# Patient Record
Sex: FEMALE | Race: BLACK OR AFRICAN AMERICAN
[De-identification: names, ages, dates, MRNs, and addresses within clinical notes are randomized per-mention and may not be internally consistent; named-entity substitution may affect disease eponyms.]

---

## 2019-04-02 ENCOUNTER — HOSPITAL ENCOUNTER (INPATIENT)
Dept: HOSPITAL 72 - EMR | Age: 59
LOS: 3 days | Discharge: ANOTHER HOSPITAL | DRG: 863 | End: 2019-04-05
Payer: COMMERCIAL

## 2019-04-02 VITALS — SYSTOLIC BLOOD PRESSURE: 91 MMHG | DIASTOLIC BLOOD PRESSURE: 68 MMHG

## 2019-04-02 VITALS — DIASTOLIC BLOOD PRESSURE: 83 MMHG | SYSTOLIC BLOOD PRESSURE: 127 MMHG

## 2019-04-02 VITALS — HEIGHT: 65 IN | WEIGHT: 230 LBS | BODY MASS INDEX: 38.32 KG/M2

## 2019-04-02 VITALS — SYSTOLIC BLOOD PRESSURE: 125 MMHG | DIASTOLIC BLOOD PRESSURE: 86 MMHG

## 2019-04-02 DIAGNOSIS — T81.41XA: Primary | ICD-10-CM

## 2019-04-02 DIAGNOSIS — I10: ICD-10-CM

## 2019-04-02 DIAGNOSIS — G89.4: ICD-10-CM

## 2019-04-02 DIAGNOSIS — Z98.84: ICD-10-CM

## 2019-04-02 DIAGNOSIS — D63.8: ICD-10-CM

## 2019-04-02 DIAGNOSIS — J45.909: ICD-10-CM
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
## 2019-04-02 DIAGNOSIS — E87.6: ICD-10-CM

## 2019-04-02 DIAGNOSIS — K80.20: ICD-10-CM

## 2019-04-02 DIAGNOSIS — M54.9: ICD-10-CM

## 2019-04-02 DIAGNOSIS — L02.91: ICD-10-CM

## 2019-04-02 DIAGNOSIS — E78.5: ICD-10-CM

## 2019-04-02 DIAGNOSIS — E66.01: ICD-10-CM

## 2019-04-02 LAB
ADD MANUAL DIFF: YES
ALBUMIN SERPL-MCNC: 2.5 G/DL (ref 3.4–5)
ALBUMIN/GLOB SERPL: 0.6 {RATIO} (ref 1–2.7)
ALP SERPL-CCNC: 58 U/L (ref 46–116)
ALT SERPL-CCNC: 21 U/L (ref 12–78)
ANION GAP SERPL CALC-SCNC: 18 MMOL/L (ref 5–15)
AST SERPL-CCNC: 16 U/L (ref 15–37)
BILIRUB SERPL-MCNC: 0.8 MG/DL (ref 0.2–1)
BUN SERPL-MCNC: 24 MG/DL (ref 7–18)
CALCIUM SERPL-MCNC: 8.5 MG/DL (ref 8.5–10.1)
CHLORIDE SERPL-SCNC: 104 MMOL/L (ref 98–107)
CO2 SERPL-SCNC: 22 MMOL/L (ref 21–32)
CREAT SERPL-MCNC: 1.3 MG/DL (ref 0.55–1.3)
ERYTHROCYTE [DISTWIDTH] IN BLOOD BY AUTOMATED COUNT: 11.7 % (ref 11.6–14.8)
GLOBULIN SER-MCNC: 4.4 G/DL
HCT VFR BLD CALC: 39.5 % (ref 37–47)
HGB BLD-MCNC: 13 G/DL (ref 12–16)
MCV RBC AUTO: 97 FL (ref 80–99)
PLATELET # BLD: 264 K/UL (ref 150–450)
POTASSIUM SERPL-SCNC: 3.1 MMOL/L (ref 3.5–5.1)
RBC # BLD AUTO: 4.05 M/UL (ref 4.2–5.4)
SODIUM SERPL-SCNC: 144 MMOL/L (ref 136–145)
WBC # BLD AUTO: 19.1 K/UL (ref 4.8–10.8)

## 2019-04-02 PROCEDURE — 84550 ASSAY OF BLOOD/URIC ACID: CPT

## 2019-04-02 PROCEDURE — 83690 ASSAY OF LIPASE: CPT

## 2019-04-02 PROCEDURE — 83735 ASSAY OF MAGNESIUM: CPT

## 2019-04-02 PROCEDURE — 87181 SC STD AGAR DILUTION PER AGT: CPT

## 2019-04-02 PROCEDURE — 96368 THER/DIAG CONCURRENT INF: CPT

## 2019-04-02 PROCEDURE — 80053 COMPREHEN METABOLIC PANEL: CPT

## 2019-04-02 PROCEDURE — 81001 URINALYSIS AUTO W/SCOPE: CPT

## 2019-04-02 PROCEDURE — 82378 CARCINOEMBRYONIC ANTIGEN: CPT

## 2019-04-02 PROCEDURE — 85044 MANUAL RETICULOCYTE COUNT: CPT

## 2019-04-02 PROCEDURE — 87205 SMEAR GRAM STAIN: CPT

## 2019-04-02 PROCEDURE — 82607 VITAMIN B-12: CPT

## 2019-04-02 PROCEDURE — 85610 PROTHROMBIN TIME: CPT

## 2019-04-02 PROCEDURE — 85007 BL SMEAR W/DIFF WBC COUNT: CPT

## 2019-04-02 PROCEDURE — 84100 ASSAY OF PHOSPHORUS: CPT

## 2019-04-02 PROCEDURE — 86140 C-REACTIVE PROTEIN: CPT

## 2019-04-02 PROCEDURE — 82550 ASSAY OF CK (CPK): CPT

## 2019-04-02 PROCEDURE — 82728 ASSAY OF FERRITIN: CPT

## 2019-04-02 PROCEDURE — 84439 ASSAY OF FREE THYROXINE: CPT

## 2019-04-02 PROCEDURE — 80061 LIPID PANEL: CPT

## 2019-04-02 PROCEDURE — 83036 HEMOGLOBIN GLYCOSYLATED A1C: CPT

## 2019-04-02 PROCEDURE — 82746 ASSAY OF FOLIC ACID SERUM: CPT

## 2019-04-02 PROCEDURE — 83605 ASSAY OF LACTIC ACID: CPT

## 2019-04-02 PROCEDURE — 83880 ASSAY OF NATRIURETIC PEPTIDE: CPT

## 2019-04-02 PROCEDURE — 80202 ASSAY OF VANCOMYCIN: CPT

## 2019-04-02 PROCEDURE — 36415 COLL VENOUS BLD VENIPUNCTURE: CPT

## 2019-04-02 PROCEDURE — 85025 COMPLETE CBC W/AUTO DIFF WBC: CPT

## 2019-04-02 PROCEDURE — 83550 IRON BINDING TEST: CPT

## 2019-04-02 PROCEDURE — 83540 ASSAY OF IRON: CPT

## 2019-04-02 PROCEDURE — 80048 BASIC METABOLIC PNL TOTAL CA: CPT

## 2019-04-02 PROCEDURE — 87040 BLOOD CULTURE FOR BACTERIA: CPT

## 2019-04-02 PROCEDURE — 83615 LACTATE (LD) (LDH) ENZYME: CPT

## 2019-04-02 PROCEDURE — 82977 ASSAY OF GGT: CPT

## 2019-04-02 PROCEDURE — 87070 CULTURE OTHR SPECIMN AEROBIC: CPT

## 2019-04-02 PROCEDURE — 85660 RBC SICKLE CELL TEST: CPT

## 2019-04-02 PROCEDURE — 74176 CT ABD & PELVIS W/O CONTRAST: CPT

## 2019-04-02 PROCEDURE — 99285 EMERGENCY DEPT VISIT HI MDM: CPT

## 2019-04-02 PROCEDURE — 84443 ASSAY THYROID STIM HORMONE: CPT

## 2019-04-02 PROCEDURE — 96365 THER/PROPH/DIAG IV INF INIT: CPT

## 2019-04-02 PROCEDURE — 85730 THROMBOPLASTIN TIME PARTIAL: CPT

## 2019-04-02 RX ADMIN — DOCUSATE SODIUM SCH MG: 100 CAPSULE, LIQUID FILLED ORAL at 21:31

## 2019-04-02 RX ADMIN — HYDROCODONE BITARTRATE AND ACETAMINOPHEN PRN TAB: 5; 325 TABLET ORAL at 20:25

## 2019-04-02 RX ADMIN — TOPIRAMATE SCH MG: 100 TABLET, COATED ORAL at 21:31

## 2019-04-02 NOTE — NUR
CASE MANAGEMENT: INITIAL REVIEW 



59 YO F PRESENTED TO OUR ED FROM HOME 



CC: LEFT LOWER ABD DISCHARGE 



PMHx: HTN. ASTHMA. GASTRIC BYPASS. 



SI:SURGICAL SITE INFECTION 

T 97.5 HR 95 RR 20 B/P 107/61 SATS 100% ON RA 

WBC 19.1 K 3.1 BUN 24 



IS:ZOSYN IV X1 

VANCO IV X1 

NS BOLUS X1 



***PATIENT ADMITTED TO MED/SURG 4/2/2019 @ 0352***



DCP: PATIENT TO BE DISCHARGE TO HOME ONCE MEDICALLY CLEARED. 



PLAN OF CARE: 

IV ANTIBx 

-------------------------------------------------------------------------------

Addendum: 04/02/19 at 1956 by Jocelyn Teresa CM

-------------------------------------------------------------------------------

INTERQUAL MET

## 2019-04-02 NOTE — NUR
ED Nurse Note:



Pt came in due to LLQ abd pain x 4-5 days with foul odor. Denies fever. Pt 
states she had a gastric bypass 03/26/2019. Noted opening and moderate amount 
of pus coming out from one of the incisions. Pt is AAO x4, ambulatory with non 
labored breathing. VSS. Family member at the bed side.

## 2019-04-02 NOTE — NUR
NURSE NOTES:

Pt received from ER s/p gastric bypass, infection to incisional site. Pt breathing room air. 
AO x 4 , answer s all questions appropriately, stated she has been experiencing discomfort 
since Friday, and begun smelling a foul odor today , which led her to the ER . antibiotic 
and Zofran given in the ER. Dr Borges paged for orders, . Abdomen soft denies pain upon 
palpation, abdominal sounds present and hypoactive, has flatus , but denies having a bowel 
movement, last bm on Friday. bilateral extremity strength 5/5 to legs no presence of edema. 
call light is in reach , bed is in low position. Encouraged to press call light for 
toileting needs

## 2019-04-02 NOTE — DIAGNOSTIC IMAGING REPORT
Indication: Abdominal swelling

 

Technique: Spiral acquisitions obtained through the abdomen and pelvis. No oral

contrast utilized, per emergency room physician request No IV contrast utilized,  per

referring physician request.. Multiplanar reconstructions were generated. Total dose

length product 1120.28 mGycm. CTDIvol(s) 19.75 mGy. Dose reduction achieved using

automated exposure control

 

 

Comparison: None

 

Findings: Inflammatory changes are seen within the subcutaneous fat of the left lower

quadrant. A few small gas bubbles are seen in this area as well. Ill-defined

confluent opacity in the center this area measures 4.6 x 1.9 cm. Evaluation of this

is limited in the absence of IV contrast.

 

Lack of enteric contrast limits assessment of the GI tract. The appendix is normal.

No evidence of diverticulosis or diverticulitis. No free or loculated intraperitoneal

gas or fluid. No small bowel distention. There is evidence of prior gastric bypass

surgery, gastroenteric anastomosis and enteroenteric anastomosis. The distal

esophagus and native duodenum are unremarkable. There are 2 adjacent small

fat-containing ventral hernias

 

Lack of IV contrast limits assessment of the solid organs. The gallbladder contains

gallstones. The liver is grossly unremarkable. No biliary ductal dilatation.

Pancreas, spleen, adrenals, kidneys are unremarkable. No retroperitoneal or

mesenteric mass or adenopathy. No pelvic mass or adenopathy. The bladder, uterus,

adnexal structures are unremarkable. Nonspecific eggshell calcification is seen in

the right upper pelvis

 

Included lung bases demonstrate right basilar atelectatic changes. There is equivocal

trace pleural fluid on the right. There are superior endplate compression deformities

of the L2 and L3 vertebral bodies. There are degenerative changes of the lumbar

spine.

 

Impression: Inflammatory changes of the subcutaneous fat of the left lower quadrant.

Per discussion with referring physician, patient is recently status post laparoscopy

with a laparoscopic port in this area. As this area appears much more inflamed than

the other laparoscopy ports and is reportedly draining fluid, findings are suspicious

for infection. Possibility of abscess cannot be excluded in the absence of IV

contrast administration

 

Limited assessment of the GI tract due to lack of enteric contrast administration

 

Cholelithiasis

 

Equivocal trace right pleural fluid

 

L2 and L3 superior endplate compression fracture deformities, acuity indeterminate.

Consider MRI for better characterization if this is clinically relevant

 

Other findings as noted, including right basilar pulmonary atelectasis, degenerative

lumbar spondylosis, small fat-containing ventral hernias

 

Findings discussed by phone with Dr. Jamehdor in the emergency room at the time of

interpretation

 

 

 

The CT scanner at [facility] is accredited by the [employer] College of

Radiology and the scans are performed using protocols designed to limit radiation

exposure to as low as reasonably achievable to attain images of sufficient resolution

adequate for diagnostic evaluation.

## 2019-04-02 NOTE — EMERGENCY ROOM REPORT
History of Present Illness


General


Chief Complaint:  General Complaint


Source:  Patient


 (Lata Cordova )





Present Illness


HPI


Patient reports that she had gastric bypass surgery last Tuesday





Had a phone call follow-up with her nurse several days ago which she had 

discomfort in the lower abdomen for


She was reassured


Today however she noticed that there was some oozing from the left lower 

abdominal area





Denies any fevers or chills denies any chest pain or short of breath denies any 

vomiting or diarrhea


 (Lata Cordova DO)


Allergies:  


Coded Allergies:  


     No Known Allergies (Unverified , 4/2/19)





Patient History


Past Medical History:  see triage record


Pertinent Family History:  none


Pregnant Now:  No


Reviewed Nursing Documentation:  PMH: Agreed; PSxH: Agreed (Lata Cordova DO)





Nursing Documentation-PMH


Past Medical History:  No History, Except For


Hx Hypertension:  Yes


Hx Asthma:  Yes


Hx Gastrointestinal Problems:  Yes - Gastric Bypass


 (Lata Cordova DO)





Review of Systems


All Other Systems:  negative except mentioned in HPI


 (Lata Cordova DO)





Physical Exam





Vital Signs








  Date Time  Temp Pulse Resp B/P (MAP) Pulse Ox O2 Delivery O2 Flow Rate FiO2


 


4/2/19 12:14 97.5 95 20 107/61 100 Room Air  








Sp02 EP Interpretation:  reviewed, normal


General Appearance:  well appearing, no apparent distress


Head:  normocephalic, atraumatic


Eyes:  bilateral eye PERRL, bilateral eye EOMI


ENT:  normal pharynx, no angioedema


Neck:  supple


Respiratory:  lungs clear, no retraction


Cardiovascular #1:  regular rate, rhythm


Gastrointestinal:  other - Several areas of what appeared to be likely 

laparoscopic entrance to the abdominal region, the right and midline surgical 

wounds appear to be healing well without any discomfort the area in the left 

mid abdomen, has dehisced, there is some discharge from that region also along 

with mild cellulitis, the region is not tender on palpation there is no 

fluctuance at this time


Musculoskeletal:  normal inspection


Neurologic:  alert, oriented x3


Skin:  other - As above


Lymphatic:  no adenopathy


 (Lata Cordova DO)





Medical Decision Making


Diagnostic Impression:  


 Primary Impression:  


 Surgical site infection


 Additional Impressions:  


 Abscess


 Gallstones


ER Course


Patient appears to have findings consistent with a surgical site infection, the 

area appears to have opened with some drainage.  The area is irrigated does not 

appear to be traversed the abdominal cavity appears to be fairly superficial.  

The area is further irrigated small packing is applied to keep the area open 

for continued draining and patient requires urgent follow-up with her surgical 

specialist,





Labs








Test


  4/2/19


13:30


 


White Blood Count


  19.1 K/UL


(4.8-10.8)


 


Red Blood Count


  4.05 M/UL


(4.20-5.40)


 


Hemoglobin


  13.0 G/DL


(12.0-16.0)


 


Hematocrit


  39.5 %


(37.0-47.0)


 


Mean Corpuscular Volume 97 FL (80-99) 


 


Mean Corpuscular Hemoglobin


  32.0 PG


(27.0-31.0)


 


Mean Corpuscular Hemoglobin


Concent 32.9 G/DL


(32.0-36.0)


 


Red Cell Distribution Width


  11.7 %


(11.6-14.8)


 


Platelet Count


  264 K/UL


(150-450)


 


Mean Platelet Volume


  7.0 FL


(6.5-10.1)


 


Neutrophils (%) (Auto)  % (45.0-75.0) 


 


Lymphocytes (%) (Auto)  % (20.0-45.0) 


 


Monocytes (%) (Auto)  % (1.0-10.0) 


 


Eosinophils (%) (Auto)  % (0.0-3.0) 


 


Basophils (%) (Auto)  % (0.0-2.0) 








 (Lata Cordova DO)


ER Course


CT of abdomen pelvis read by radiology showed inflammatory changes near the 

incision with some subcutaneous air consistent with infectious process and 

possible abscess.  Dr. Lata Adler was contacted for inpatient management.  Dr. Bonilla was contacted general surgery consult.





Labs








Test


  4/2/19


13:30 4/2/19


13:58


 


White Blood Count


  19.1 K/UL


(4.8-10.8) 


 


 


Red Blood Count


  4.05 M/UL


(4.20-5.40) 


 


 


Hemoglobin


  13.0 G/DL


(12.0-16.0) 


 


 


Hematocrit


  39.5 %


(37.0-47.0) 


 


 


Mean Corpuscular Volume 97 FL (80-99)  


 


Mean Corpuscular Hemoglobin


  32.0 PG


(27.0-31.0) 


 


 


Mean Corpuscular Hemoglobin


Concent 32.9 G/DL


(32.0-36.0) 


 


 


Red Cell Distribution Width


  11.7 %


(11.6-14.8) 


 


 


Platelet Count


  264 K/UL


(150-450) 


 


 


Mean Platelet Volume


  7.0 FL


(6.5-10.1) 


 


 


Neutrophils (%) (Auto)  % (45.0-75.0)  


 


Lymphocytes (%) (Auto)  % (20.0-45.0)  


 


Monocytes (%) (Auto)  % (1.0-10.0)  


 


Eosinophils (%) (Auto)  % (0.0-3.0)  


 


Basophils (%) (Auto)  % (0.0-2.0)  


 


Differential Total Cells


Counted 100 


  


 


 


Neutrophils % (Manual) 83 % (45-75)  


 


Lymphocytes % (Manual) 12 % (20-45)  


 


Monocytes % (Manual) 5 % (1-10)  


 


Eosinophils % (Manual) 0 % (0-3)  


 


Basophils % (Manual) 0 % (0-2)  


 


Band Neutrophils 0 % (0-8)  


 


Platelet Estimate Adequate  


 


Platelet Morphology Normal  


 


Lactic Acid Level


  0.70 mmol/L


(0.4-2.0) 


 


 


Sodium Level


  


  144 MMOL/L


(136-145)


 


Potassium Level


  


  3.1 MMOL/L


(3.5-5.1)


 


Chloride Level


  


  104 MMOL/L


()


 


Carbon Dioxide Level


  


  22 MMOL/L


(21-32)


 


Anion Gap


  


  18 mmol/L


(5-15)


 


Blood Urea Nitrogen


  


  24 mg/dL


(7-18)


 


Creatinine


  


  1.3 MG/DL


(0.55-1.30)


 


Estimat Glomerular Filtration


Rate 


  50.8 mL/min


(>60)


 


Glucose Level


  


  84 MG/DL


()


 


Calcium Level


  


  8.5 MG/DL


(8.5-10.1)


 


Total Bilirubin


  


  0.8 MG/DL


(0.2-1.0)


 


Aspartate Amino Transf


(AST/SGOT) 


  16 U/L (15-37) 


 


 


Alanine Aminotransferase


(ALT/SGPT) 


  21 U/L (12-78) 


 


 


Alkaline Phosphatase


  


  58 U/L


()


 


Total Protein


  


  6.9 G/DL


(6.4-8.2)


 


Albumin


  


  2.5 G/DL


(3.4-5.0)


 


Globulin  4.4 g/dL 


 


Albumin/Globulin Ratio  0.6 (1.0-2.7) 








 (Dean Khan MD)





CT/MRI/US Diagnostic Results


CT/MRI/US Diagnostic Results :  


   Impression


CT abdomen pelvisImpression: Inflammatory changes of the subcutaneous fat of 

the left lower quadrant.


Per discussion with referring physician, patient is recently status post 

laparoscopy


with a laparoscopic port in this area. As this area appears much more inflamed 

than


the other laparoscopy ports and is reportedly draining fluid, findings are 

suspicious


for infection. Possibility of abscess cannot be excluded in the absence of IV


contrast administration


 


Limited assessment of the GI tract due to lack of enteric contrast 

administration


 


Cholelithiasis


 


Equivocal trace right pleural fluid


 


L2 and L3 superior endplate compression fracture deformities, acuity 

indeterminate.


Consider MRI for better characterization if this is clinically relevant


 


Other findings as noted, including right basilar pulmonary atelectasis, 

degenerative


lumbar spondylosis, small fat-containing ventral hernias


 


Findings discussed by phone with Dr. Cordova in the emergency room at the time 

of


interpretation


 (Lata Cordova DO)





Last Vital Signs








  Date Time  Temp Pulse Resp B/P (MAP) Pulse Ox O2 Delivery O2 Flow Rate FiO2


 


4/2/19 12:14 97.5 95 20 107/61 100 Room Air  








Status:  improved


 (Lata Cordova DO)


Status:  unchanged


 (Dean Khan MD)


Disposition:  ADMITTED AS INPATIENT


Condition:  Serious











Lata Cordova DO Apr 2, 2019 12:29


Dean Khan MD Apr 2, 2019 15:40

## 2019-04-02 NOTE — NUR
NURSE NOTES:

Dr. Davis came and saw pt. At bedside MD cultured abdominal wound site x 2. Collected and 
sent to lab.

## 2019-04-02 NOTE — NUR
NURSE NOTES:

Dr Borges returned call to facility gave orders , orders read back and carried out. Dr Borges gave orders to place Jazzy on consult for pt current pain medications and Dr Mancia 
for her gastro needs. Dr Borges has already placed consult for her wounds

## 2019-04-02 NOTE — CONSULTATION
DATE OF CONSULTATION:  04/02/2019

CONSULTING PHYSICIAN:  Anthony Davis M.D.



REQUESTING PHYSICIAN:  Lata Borges M.D.



REASON FOR CONSULTATION:  Wound infection.



HISTORY OF PRESENT ILLNESS:  This is a 58-year-old female, who reportedly

has undergone bariatric surgery exactly one week ago.  She stated that

about four days ago, she started having discomfort at the incision of the

left upper quadrant and the discomfort gradually increased.  She denies

any fever or chills.  She states that yesterday wound had drain, foul

smelling drainage.



PAST MEDICAL HISTORY:  She denies allergies, asthma, diabetes, cardiac,

renal diseases.  She has a history of hypertension.



PAST SURGICAL HISTORY:  Include gastric bypass and revision of the gastric

bypass.



MEDICATIONS:  Hydrochlorothiazide.



SOCIAL HISTORY:  The patient is a 58-year-old female, who is ,

mother of two children.  She is unemployed.  Denies smoking and

drinking.



REVIEW OF SYSTEMS:  She only complains of obesity.



PHYSICAL EXAMINATION:

GENERAL:  The patient appeared to be a well-developed, well-nourished,

58-year-old female, lying on the bed, complaining of discomfort in the

left upper quadrant.

HEENT:  Head is normocephalic and atraumatic.  Eyes, pupils are equal,

round, and reactive to light.  Mouth is clear.

NECK:  There is no palpable thyromegaly or adenopathy.

CHEST:  Clear to auscultation and percussion.

HEART:  There is no gallop or murmur.  S1 and S2 are within normal

limits.

ABDOMEN:  Obese, but soft.  She has a scar of the laparoscopy surgery.  The

trocar site at the left upper quadrant which is about one inch in length,

ti is kind of open and draining foul smelling, brownish clots.

GENITAL:  Deferred.

EXTREMITIES:  Within normal limits.



ASSESSMENT:  Infected incision.



PLAN:  At the bed side large amount of pus was drained and then the wound

was packed with Betadine-soaked sponge.  She will require this packing

twice a day.  If there is any improvement in the drainage, we will

continue it otherwise she will require to have the incision extended and

completely drain.









  ______________________________________________

  Anthony Davis M.D.





DR:  [place]

D:  04/02/2019 20:33

T:  04/02/2019 22:01

JOB#:  [id number]

CC:

## 2019-04-02 NOTE — NUR
NURSE NOTES:

Received report from DARIN Vallejo. Received pt lying in bed, AOX4, pain level 4/10, will 
medicate for pain as soon as am nurse enter orders. Bed in lowest position and locked, side 
rails up x 2, call light within reach. Will continue to monitor.

## 2019-04-03 VITALS — DIASTOLIC BLOOD PRESSURE: 71 MMHG | SYSTOLIC BLOOD PRESSURE: 117 MMHG

## 2019-04-03 VITALS — SYSTOLIC BLOOD PRESSURE: 117 MMHG | DIASTOLIC BLOOD PRESSURE: 77 MMHG

## 2019-04-03 VITALS — DIASTOLIC BLOOD PRESSURE: 68 MMHG | SYSTOLIC BLOOD PRESSURE: 117 MMHG

## 2019-04-03 VITALS — DIASTOLIC BLOOD PRESSURE: 54 MMHG | SYSTOLIC BLOOD PRESSURE: 107 MMHG

## 2019-04-03 VITALS — SYSTOLIC BLOOD PRESSURE: 106 MMHG | DIASTOLIC BLOOD PRESSURE: 69 MMHG

## 2019-04-03 VITALS — SYSTOLIC BLOOD PRESSURE: 115 MMHG | DIASTOLIC BLOOD PRESSURE: 74 MMHG

## 2019-04-03 LAB
ADD MANUAL DIFF: NO
ANION GAP SERPL CALC-SCNC: 15 MMOL/L (ref 5–15)
APPEARANCE UR: CLEAR
APTT PPP: (no result) S
BASOPHILS NFR BLD AUTO: 0.5 % (ref 0–2)
BUN SERPL-MCNC: 18 MG/DL (ref 7–18)
CALCIUM SERPL-MCNC: 9.4 MG/DL (ref 8.5–10.1)
CHLORIDE SERPL-SCNC: 103 MMOL/L (ref 98–107)
CO2 SERPL-SCNC: 22 MMOL/L (ref 21–32)
CREAT SERPL-MCNC: 1 MG/DL (ref 0.55–1.3)
EOSINOPHIL NFR BLD AUTO: 0.3 % (ref 0–3)
ERYTHROCYTE [DISTWIDTH] IN BLOOD BY AUTOMATED COUNT: 11.3 % (ref 11.6–14.8)
GLUCOSE UR STRIP-MCNC: NEGATIVE MG/DL
HCT VFR BLD CALC: 35 % (ref 37–47)
HGB BLD-MCNC: 11.3 G/DL (ref 12–16)
INR PPP: 1.1 (ref 0.9–1.1)
KETONES UR QL STRIP: (no result)
LEUKOCYTE ESTERASE UR QL STRIP: (no result)
LYMPHOCYTES NFR BLD AUTO: 13.8 % (ref 20–45)
MCV RBC AUTO: 98 FL (ref 80–99)
MONOCYTES NFR BLD AUTO: 10 % (ref 1–10)
NEUTROPHILS NFR BLD AUTO: 75.4 % (ref 45–75)
NITRITE UR QL STRIP: NEGATIVE
PH UR STRIP: 6.5 [PH] (ref 4.5–8)
PLATELET # BLD: 267 K/UL (ref 150–450)
POTASSIUM SERPL-SCNC: 3.2 MMOL/L (ref 3.5–5.1)
PROT UR QL STRIP: (no result)
RBC # BLD AUTO: 3.56 M/UL (ref 4.2–5.4)
SODIUM SERPL-SCNC: 140 MMOL/L (ref 136–145)
SP GR UR STRIP: 1.01 (ref 1–1.03)
UROBILINOGEN UR-MCNC: NORMAL MG/DL (ref 0–1)
WBC # BLD AUTO: 15.1 K/UL (ref 4.8–10.8)

## 2019-04-03 RX ADMIN — TOPIRAMATE SCH MG: 100 TABLET, COATED ORAL at 09:40

## 2019-04-03 RX ADMIN — DOCUSATE SODIUM SCH MG: 100 CAPSULE, LIQUID FILLED ORAL at 17:32

## 2019-04-03 RX ADMIN — HYDROCODONE BITARTRATE AND ACETAMINOPHEN PRN TAB: 10; 325 TABLET ORAL at 09:42

## 2019-04-03 RX ADMIN — DOCUSATE SODIUM SCH MG: 100 CAPSULE, LIQUID FILLED ORAL at 09:40

## 2019-04-03 RX ADMIN — HYDROCODONE BITARTRATE AND ACETAMINOPHEN PRN TAB: 10; 325 TABLET ORAL at 23:34

## 2019-04-03 RX ADMIN — DEXTROSE MONOHYDRATE SCH MLS/HR: 50 INJECTION, SOLUTION INTRAVENOUS at 20:42

## 2019-04-03 RX ADMIN — DEXTROSE, SODIUM CHLORIDE, AND POTASSIUM CHLORIDE SCH MLS/HR: 5; .45; .15 INJECTION INTRAVENOUS at 16:26

## 2019-04-03 RX ADMIN — HYDROCODONE BITARTRATE AND ACETAMINOPHEN PRN TAB: 5; 325 TABLET ORAL at 00:41

## 2019-04-03 RX ADMIN — SODIUM CHLORIDE SCH MLS/HR: 9 INJECTION, SOLUTION INTRAVENOUS at 16:28

## 2019-04-03 RX ADMIN — DEXTROSE MONOHYDRATE SCH MLS/HR: 50 INJECTION, SOLUTION INTRAVENOUS at 10:46

## 2019-04-03 NOTE — NUR
NURSE NOTES:

Pt asked to provide with urine specimen accidently flushed urine. Endorsed to oncoming  
nurse that urine requires to be collected.

## 2019-04-03 NOTE — CONSULTATION
History of Present Illness


General


Chief Complaint:  General Complaint


Referring physician:  ED Capps


Reason for Consultation:  ABDOMINAL PAIN





Present Illness


Allergies:  


Coded Allergies:  


     No Known Allergies (Unverified , 4/2/19)





Medication History


Scheduled


Amlodipine Besylate* (Amlodipine Besylate*), 10 MG ORAL DAILY, (Reported)


Atorvastatin Calcium* (Lipitor*), 10 MG ORAL DAILY, (Reported)


Baclofen* (Baclofen*), 10 MG ORAL THREE TIMES A DAY, (Reported)


Docusate Sodium* (Docusate Sodium*), 100 MG ORAL DAILY, (Reported)


Escitalopram Oxalate* (Lexapro*), 20 MG ORAL DAILY, (Reported)


Gabapentin* (Gabapentin*), 600 MG ORAL THREE TIMES A DAY, (Reported)


Hydrocodone Bit/Acetaminophen * (Hydrocodon-Acetaminophn *), 1 TAB 

ORAL Q6H, (Reported)


Losartan/Hydrochlorothiazide (Losartan-Hctz 100-12.5 Mg Tab), 1 TAB ORAL DAILY, 

(Reported)


Nabumetone (Nabumetone), 750 MG PO TWICE A DAY, (Reported)


Topiramate* (Topamax*), 100 MG ORAL TWICE A DAY, (Reported)





Patient History


Healthcare decision maker


Mr. Llanes [phone]


Resuscitation status


Full Code


Advanced Directive on File


No





Physical Exam





Last 24 Hour Vital Signs








  Date Time  Temp Pulse Resp B/P (MAP) Pulse Ox O2 Delivery O2 Flow Rate FiO2


 


4/3/19 20:30 98.1 86 17 115/74 (88) 98   


 


4/3/19 19:15 98.5 112 16 117/68 (84) 95   


 


4/3/19 16:00 98.3 87 18 117/77 (90)    


 


4/3/19 12:00 98.7 87 18 117/71 (86)    


 


4/3/19 09:47  90  107/75    


 


4/3/19 09:00      Room Air  


 


4/3/19 08:00 98.2 90 18 107/54 (71) 94   


 


4/3/19 04:00 98.4 87 19 106/69 (81) 97   


 


4/2/19 23:54 98.0 80 19 125/86 (99) 96   

















Intake and Output  


 


 4/2/19 4/3/19





 19:00 07:00


 


Intake Total 1385 ml 585 ml


 


Balance 1385 ml 585 ml


 


  


 


Intake Oral 0 ml 


 


IV Total 1385 ml 585 ml


 


# Voids  3











Laboratory Tests








Test


  4/3/19


05:35


 


White Blood Count


  15.1 K/UL


(4.8-10.8)  H


 


Red Blood Count


  3.56 M/UL


(4.20-5.40)  L


 


Hemoglobin


  11.3 G/DL


(12.0-16.0)  L


 


Hematocrit


  35.0 %


(37.0-47.0)  L


 


Mean Corpuscular Volume 98 FL (80-99)  


 


Mean Corpuscular Hemoglobin


  31.7 PG


(27.0-31.0)  H


 


Mean Corpuscular Hemoglobin


Concent 32.3 G/DL


(32.0-36.0)


 


Red Cell Distribution Width


  11.3 %


(11.6-14.8)  L


 


Platelet Count


  267 K/UL


(150-450)


 


Mean Platelet Volume


  6.7 FL


(6.5-10.1)


 


Neutrophils (%) (Auto)


  75.4 %


(45.0-75.0)  H


 


Lymphocytes (%) (Auto)


  13.8 %


(20.0-45.0)  L


 


Monocytes (%) (Auto)


  10.0 %


(1.0-10.0)


 


Eosinophils (%) (Auto)


  0.3 %


(0.0-3.0)


 


Basophils (%) (Auto)


  0.5 %


(0.0-2.0)


 


Sodium Level


  140 MMOL/L


(136-145)


 


Potassium Level


  3.2 MMOL/L


(3.5-5.1)  L


 


Chloride Level


  103 MMOL/L


()


 


Carbon Dioxide Level


  22 MMOL/L


(21-32)


 


Anion Gap


  15 mmol/L


(5-15)


 


Blood Urea Nitrogen


  18 mg/dL


(7-18)


 


Creatinine


  1.0 MG/DL


(0.55-1.30)


 


Estimat Glomerular Filtration


Rate > 60 mL/min


(>60)


 


Glucose Level


  71 MG/DL


()  L


 


Calcium Level


  9.4 MG/DL


(8.5-10.1)











Microbiology








 Date/Time


Source Procedure


Growth Status


 


 


 4/2/19 21:45


Abdomen Gram Stain - Final Resulted


 


 4/2/19 21:45


Abdomen Wound Culture


Pending Resulted








Height (Feet):  5


Height (Inches):  5.00


Weight (Pounds):  230


Medications





Current Medications








 Medications


  (Trade)  Dose


 Ordered  Sig/Justin


 Route


 PRN Reason  Start Time


 Stop Time Status Last Admin


Dose Admin


 


 Acetaminophen


  (Tylenol)  500 mg  Q4H  PRN


 ORAL


 Mild Pain/Temp > 100.5  4/2/19 20:15


 5/2/19 20:14   


 


 


 Acetaminophen/


 Hydrocodone Bitart


  (Norco 10/325)  1 tab  Q4H  PRN


 ORAL


 severe pain   4/3/19 09:15


 4/10/19 09:14  4/3/19 09:42


 


 


 Amlodipine


 Besylate


  (Norvasc)  2.5 mg  DAILY


 ORAL


   4/4/19 09:00


 5/3/19 08:59   


 


 


 Baclofen


  (Lioresal)  10 mg  Q8H  PRN


 ORAL


 Muscle Spasm  4/2/19 20:15


 5/2/19 20:14   


 


 


 Clonidine HCl


  (Catapres Tab)  0.1 mg  Q4H  PRN


 ORAL


 For High Blood Pressure  4/2/19 20:00


 5/2/19 19:59   


 


 


 Dextrose/


 Electrolytes  1,000 ml @ 


 50 mls/hr  Q20H


 IV


   4/3/19 15:00


 5/3/19 14:59  4/3/19 16:26


 


 


 Docusate Sodium


  (Colace)  100 mg  TID


 ORAL


   4/3/19 18:00


 5/2/19 20:14  4/3/19 17:32


 


 


 Escitalopram


 Oxalate


  (Lexapro)  20 mg  DAILY


 ORAL


   4/3/19 09:00


 5/3/19 08:59  4/3/19 09:41


 


 


 Gabapentin


  (Neurontin)  600 mg  THREE TIMES A  DAY


 ORAL


   4/3/19 09:15


 5/3/19 09:14  4/3/19 17:32


 


 


 Ondansetron HCl


  (Zofran)  4 mg  Q6H  PRN


 IVP


 Nausea & Vomiting  4/2/19 20:45


 5/2/19 20:44   


 


 


 Piperacillin Sod/


 Tazobactam Sod


 3.375 gm/Sodium


 Chloride  110 ml @ 


 27.5 mls/hr  Q8H


 IVPB


   4/3/19 11:00


 4/10/19 10:59  4/3/19 20:42


 


 


 Potassium Chloride


  (K-Dur)  40 meq  DAILY


 ORAL


   4/3/19 09:00


 5/3/19 08:59  4/3/19 09:46


 


 


 Topiramate


  (Topamax)  100 mg  DAILY


 ORAL


   4/2/19 20:15


 5/2/19 20:14  4/3/19 09:40


 


 


 Vancomycin HCl


  (Vanco rx to


 dose)  1 ea  DAILY  PRN


 MISC


 Per rx protocol  4/3/19 14:15


 5/3/19 14:14   


 


 


 Vancomycin HCl


 750 mg/Sodium


 Chloride  275 ml @ 


 183.333


 mls/hr  Q12H


 IVPB


   4/3/19 16:00


 4/8/19 15:59  4/3/19 16:28


 











Assessment/Plan


Assessment/Plan


Hematology Consultation





DOS: 4/3/19


Reason for Hospitalization:  General Complaint


Referring physician:  ED Capps


Reason for Consultation: Anemia evaluation





HPI


Patient reports that she had gastric bypass surgery last Tuesday


Had a phone call follow-up with her nurse several days ago which she had 

discomfort in the lower abdomen for


Today however she noticed that there was some oozing from the left lower 

abdominal area


Denies any fevers or chills denies any chest pain or short of breath denies any 

vomiting or diarrhea


GI consulted for abdominal pain.  Patient seen, awake alert oriented x4, 

reported initial abdominal pain which is resided at this time.  Patient has a 

recent history of gastric bypass last week when she began to notice redness and 

drainage from her incision site.  The abdomen was assessed, noted at the 

incision site with erythema.  Wound culture was obtained last night, pending 

final pathology.  Labs reviewed; WBC of 15, hemoglobin of 11.  The patient had 

an endoscopy and colonoscopy prior to having her gastric bypass surgery.





Home Meds


Reported Medications


Topiramate* (TOPAMAX*) 100 Mg Tablet, 100 MG ORAL TWICE A DAY, #60 TAB 0 Refills


   4/2/19


Nabumetone (Nabumetone) 500 Mg Tablet, 750 MG PO TWICE A DAY, TAB


   4/2/19


Losartan/Hydrochlorothiazide (LOSARTAN-HCTZ 100-12.5 MG TAB) 1 Each Tablet, 1 

TAB ORAL DAILY, TAB


   4/2/19


Hydrocodone Bit/Acetaminophen * (HYDROCODON-ACETAMINOPHN *) 1 Each 

Tablet, 1 TAB ORAL Q6H, TAB


   4/2/19


Gabapentin* (GABAPENTIN*) 600 Mg Tablet, 600 MG ORAL THREE TIMES A DAY, TAB


   4/2/19


Escitalopram Oxalate* (LEXAPRO*) 20 Mg Tablet, 20 MG ORAL DAILY, TAB


   4/2/19


Docusate Sodium* (DOCUSATE SODIUM*) 100 Mg Capsule, 100 MG ORAL DAILY, CAP


   4/2/19


Baclofen* (BACLOFEN*) 10 Mg Tablet, 10 MG ORAL THREE TIMES A DAY, TAB


   4/2/19


Atorvastatin Calcium* (LIPITOR*) 10 Mg Tablet, 10 MG ORAL DAILY, #30 TAB 0 

Refills


   4/2/19


Amlodipine Besylate* (AMLODIPINE BESYLATE*) 10 Mg Tablet, 10 MG ORAL DAILY, TAB


   4/2/19


Med list reviewed/reconciled:  Yes


Allergies:  


Coded Allergies:  


     No Known Allergies (Unverified , 4/2/19)





Patient History


History Provided By:  Patient, Medical Record


PMH Narrative


Past Medical History:  No History, Except For


Hx Hypertension:  Yes


Hx Asthma:  Yes


Hx Gastrointestinal Problems:  Yes - Gastric Bypass


Social History:  Denies: smoking, alcohol use, drug use, other





Review of Systems


All Other Systems:  negative except mentioned in HPI





Physical Exam





Vital Signs








  Date Time  Temp Pulse Resp B/P (MAP) Pulse Ox O2 Delivery O2 Flow Rate FiO2


 


4/2/19 12:14 97.5 95 20 107/61 100 Room Air  








Sp02 EP Interpretation:  reviewed, normal


Labs





Laboratory Tests








Test


  4/2/19


13:30 4/2/19


13:58 4/3/19


05:35


 


White Blood Count


  19.1 K/UL


(4.8-10.8)  H 


  15.1 K/UL


(4.8-10.8)  H


 


Red Blood Count


  4.05 M/UL


(4.20-5.40)  L 


  3.56 M/UL


(4.20-5.40)  L


 


Hemoglobin


  13.0 G/DL


(12.0-16.0) 


  11.3 G/DL


(12.0-16.0)  L


 


Hematocrit


  39.5 %


(37.0-47.0) 


  35.0 %


(37.0-47.0)  L


 


Mean Corpuscular Volume 97 FL (80-99)    98 FL (80-99)  


 


Mean Corpuscular Hemoglobin


  32.0 PG


(27.0-31.0)  H 


  31.7 PG


(27.0-31.0)  H


 


Mean Corpuscular Hemoglobin


Concent 32.9 G/DL


(32.0-36.0) 


  32.3 G/DL


(32.0-36.0)


 


Red Cell Distribution Width


  11.7 %


(11.6-14.8) 


  11.3 %


(11.6-14.8)  L


 


Platelet Count


  264 K/UL


(150-450) 


  267 K/UL


(150-450)


 


Mean Platelet Volume


  7.0 FL


(6.5-10.1) 


  6.7 FL


(6.5-10.1)


 


Neutrophils (%) (Auto)


  % (45.0-75.0)


  


  75.4 %


(45.0-75.0)  H


 


Lymphocytes (%) (Auto)


  % (20.0-45.0)


  


  13.8 %


(20.0-45.0)  L


 


Monocytes (%) (Auto)


   % (1.0-10.0)  


  


  10.0 %


(1.0-10.0)


 


Eosinophils (%) (Auto)


   % (0.0-3.0)  


  


  0.3 %


(0.0-3.0)


 


Basophils (%) (Auto)


   % (0.0-2.0)  


  


  0.5 %


(0.0-2.0)


 


Differential Total Cells


Counted 100  


  


  


 


 


Neutrophils % (Manual) 83 % (45-75)  H  


 


Lymphocytes % (Manual) 12 % (20-45)  L  


 


Monocytes % (Manual) 5 % (1-10)    


 


Eosinophils % (Manual) 0 % (0-3)    


 


Basophils % (Manual) 0 % (0-2)    


 


Band Neutrophils 0 % (0-8)    


 


Platelet Estimate Adequate    


 


Platelet Morphology Normal    


 


Lactic Acid Level


  0.70 mmol/L


(0.4-2.0) 


  


 


 


Sodium Level


  


  144 MMOL/L


(136-145) 140 MMOL/L


(136-145)


 


Potassium Level


  


  3.1 MMOL/L


(3.5-5.1)  L 3.2 MMOL/L


(3.5-5.1)  L


 


Chloride Level


  


  104 MMOL/L


() 103 MMOL/L


()


 


Carbon Dioxide Level


  


  22 MMOL/L


(21-32) 22 MMOL/L


(21-32)


 


Anion Gap


  


  18 mmol/L


(5-15)  H 15 mmol/L


(5-15)


 


Blood Urea Nitrogen


  


  24 mg/dL


(7-18)  H 18 mg/dL


(7-18)


 


Creatinine


  


  1.3 MG/DL


(0.55-1.30) 1.0 MG/DL


(0.55-1.30)


 


Estimat Glomerular Filtration


Rate 


  50.8 mL/min


(>60) > 60 mL/min


(>60)


 


Glucose Level


  


  84 MG/DL


() 71 MG/DL


()  L


 


Calcium Level


  


  8.5 MG/DL


(8.5-10.1) 9.4 MG/DL


(8.5-10.1)


 


Total Bilirubin


  


  0.8 MG/DL


(0.2-1.0) 


 


 


Aspartate Amino Transf


(AST/SGOT) 


  16 U/L (15-37)


  


 


 


Alanine Aminotransferase


(ALT/SGPT) 


  21 U/L (12-78)


  


 


 


Alkaline Phosphatase


  


  58 U/L


() 


 


 


Total Protein


  


  6.9 G/DL


(6.4-8.2) 


 


 


Albumin


  


  2.5 G/DL


(3.4-5.0)  L 


 


 


Globulin  4.4 g/dL   


 


Albumin/Globulin Ratio


  


  0.6 (1.0-2.7)


L 


 








General Appearance:  well appearing, no apparent distress, alert


Head:  normocephalic


EENT:  PERRL/EOMI, normal ENT inspection


Neck:  supple


Respiratory:  normal breath sounds, no respiratory distress


Cardiovascular:  normal rate


Gastrointestinal:  normal inspection, non tender, soft, normal bowel sounds, non

-distended, other - Incision site, see HPI.


Rectal:  deferred


Genitourinary:  no CVA tenderness


Musculoskeletal:  normal inspection, back normal


Neurologic:  normal inspection, alert, oriented x3, responsive


Psychiatric:  normal inspection, judgement/insight normal, memory normal


Skin:  normal inspection, normal color, no rash, warm/dry, site reviewed on exam

, packed with dressing, removed dressing, noted to have packing into the wound, 

will be changed bid


Lymphatic:  normal inspection, no adenopathy


Current Medications





Current Medications








 Medications


  (Trade)  Dose


 Ordered  Sig/Justin


 Route


 PRN Reason  Start Time


 Stop Time Status Last Admin


Dose Admin


 


 Acetaminophen


  (Tylenol)  500 mg  Q4H  PRN


 ORAL


 Mild Pain/Temp > 100.5  4/2/19 20:15


 5/2/19 20:14   


 


 


 Acetaminophen/


 Hydrocodone Bitart


  (Norco 10/325)  1 tab  Q4H  PRN


 ORAL


 severe pain   4/3/19 09:15


 4/10/19 09:14  4/3/19 09:42


 


 


 Amlodipine


 Besylate


  (Norvasc)  10 mg  DAILY


 ORAL


   4/3/19 09:00


 5/3/19 08:59   


 


 


 Baclofen


  (Lioresal)  10 mg  Q8H  PRN


 ORAL


 Muscle Spasm  4/2/19 20:15


 5/2/19 20:14   


 


 


 Clonidine HCl


  (Catapres Tab)  0.1 mg  Q4H  PRN


 ORAL


 For High Blood Pressure  4/2/19 20:00


 5/2/19 19:59   


 


 


 Docusate Sodium


  (Colace)  100 mg  DAILY


 ORAL


   4/2/19 20:15


 5/2/19 20:14  4/3/19 09:40


 


 


 Escitalopram


 Oxalate


  (Lexapro)  20 mg  DAILY


 ORAL


   4/3/19 09:00


 5/3/19 08:59  4/3/19 09:41


 


 


 Gabapentin


  (Neurontin)  600 mg  THREE TIMES A  DAY


 ORAL


   4/3/19 09:15


 5/3/19 09:14  4/3/19 09:45


 


 


 Ondansetron HCl


  (Zofran)  4 mg  Q6H  PRN


 IVP


 Nausea & Vomiting  4/2/19 20:45


 5/2/19 20:44   


 


 


 Piperacillin Sod/


 Tazobactam Sod


 3.375 gm/Sodium


 Chloride  110 ml @ 


 27.5 mls/hr  Q8H


 IVPB


   4/3/19 11:00


 4/10/19 10:59  4/3/19 10:46


 


 


 Potassium Chloride


  (K-Dur)  40 meq  DAILY


 ORAL


   4/3/19 09:00


 5/3/19 08:59  4/3/19 09:46


 


 


 Sodium Chloride  1,000 ml @ 


 65 mls/hr  [id number]


 IV


   4/2/19 20:00


 5/2/19 19:59  4/2/19 21:31


 


 


 Topiramate


  (Topamax)  100 mg  DAILY


 ORAL


   4/2/19 20:15


 5/2/19 20:14  4/3/19 09:40


 











Assessment and Recs:


# Anemia of chronic disease (or of iron deficiency) due to underlying chronic 

medical issues, multifactorial 


--> Anemia workup has been ordered


--> No evidence of hemolysis is noted, peripheral smear has been reviewed.


--> Hgb goal >7. Transfuse prn.


--> Epogen or iron at this time is not particularly indicated


--> Medications have been reviewed


--> evaluate with Gi team prn


--> transfuse if hgb is < 7 (will trend CBC daily)


# Leukocytosis/Elevated white blood cell count, unspecified likely related to 

underlying stress reaction, smoking v more likely infection from surgical site 

drainage


--> have reviewed peripheral smear and bandemia/neutrophilia noted


--> continue antibiotics if they have been started by ID team


--> monitor for resolution


# History of gastric bypass


--> surg franca prn


# Abscess/Surgical site infection


--> Follow-up wound cultures








The timing of this note does not necessarily reflect the time of the patient 

was seen.





Greatly appreciate consultation!











Brennen Brooke MD Apr 3, 2019 21:04

## 2019-04-03 NOTE — NUR
NURSE NOTES:

Patient asleep in bed, no signs of pain, not in acute respiratory distress. Call light and 
needs in reach. Bed in lowest position and lock engaged. Will continue to monitor.

## 2019-04-03 NOTE — GENERAL PROGRESS NOTE
Assessment/Plan


Problem List:  


(1) Surgical site infection


ICD Codes:  T81.49XA - Infection following a procedure, other surgical site, 

initial encounter


SNOMED:  33339785, [id number]


(2) pain medication reveiw


(3) Anemia


ICD Codes:  D64.9 - Anemia, unspecified


SNOMED:  266785377


(4) Surgical site infection


ICD Codes:  T81.49XA - Infection following a procedure, other surgical site, 

initial encounter


SNOMED:  57278352, [id number]


Status:  progressing


Assessment/Plan


surgical site infection


abx per id


afebrile


obesity


s/p gastric bypass w surgical site infection]





Subjective


ROS Limited/Unobtainable:  Yes


Allergies:  


Coded Allergies:  


     No Known Allergies (Unverified , 4/2/19)





Objective





Last 24 Hour Vital Signs








  Date Time  Temp Pulse Resp B/P (MAP) Pulse Ox O2 Delivery O2 Flow Rate FiO2


 


4/3/19 12:00 98.7 87 18 117/71 (86)    


 


4/3/19 09:47  90  107/75    


 


4/3/19 09:00      Room Air  


 


4/3/19 08:00 98.2 90 18 107/54 (71) 94   


 


4/3/19 04:00 98.4 87 19 106/69 (81) 97   


 


4/2/19 23:54 98.0 80 19 125/86 (99) 96   


 


4/2/19 21:00      Room Air  


 


4/2/19 20:00 98.2 85 17 127/83 (98) 97   


 


4/2/19 17:17      Room Air  


 


4/2/19 14:35 97.7 88 17 126/66 100 Room Air  

















Intake and Output  


 


 4/2/19 4/3/19





 19:00 07:00


 


Intake Total 1385 ml 585 ml


 


Balance 1385 ml 585 ml


 


  


 


Intake Oral 0 ml 


 


IV Total 1385 ml 585 ml


 


# Voids  3








Laboratory Tests


4/2/19 13:30: 


White Blood Count 19.1H, Red Blood Count 4.05L, Hemoglobin 13.0, Hematocrit 39.5

, Mean Corpuscular Volume 97, Mean Corpuscular Hemoglobin 32.0H, Mean 

Corpuscular Hemoglobin Concent 32.9, Red Cell Distribution Width 11.7, Platelet 

Count 264, Mean Platelet Volume 7.0, Neutrophils (%) (Auto) , Lymphocytes (%) (

Auto) , Monocytes (%) (Auto) , Eosinophils (%) (Auto) , Basophils (%) (Auto) , 

Differential Total Cells Counted 100, Neutrophils % (Manual) 83H, Lymphocytes % 

(Manual) 12L, Monocytes % (Manual) 5, Eosinophils % (Manual) 0, Basophils % (

Manual) 0, Band Neutrophils 0, Platelet Estimate Adequate, Platelet Morphology 

Normal, Lactic Acid Level 0.70


4/2/19 13:58: 


Sodium Level 144, Potassium Level 3.1L, Chloride Level 104, Carbon Dioxide 

Level 22, Anion Gap 18H, Blood Urea Nitrogen 24H, Creatinine 1.3, Estimat 

Glomerular Filtration Rate 50.8, Glucose Level 84, Calcium Level 8.5, Total 

Bilirubin 0.8, Aspartate Amino Transf (AST/SGOT) 16, Alanine Aminotransferase (

ALT/SGPT) 21, Alkaline Phosphatase 58, Total Protein 6.9, Albumin 2.5L, 

Globulin 4.4, Albumin/Globulin Ratio 0.6L


4/3/19 05:35: 


White Blood Count 15.1H, Red Blood Count 3.56L, Hemoglobin 11.3L, Hematocrit 

35.0L, Mean Corpuscular Volume 98, Mean Corpuscular Hemoglobin 31.7H, Mean 

Corpuscular Hemoglobin Concent 32.3, Red Cell Distribution Width 11.3L, 

Platelet Count 267, Mean Platelet Volume 6.7, Neutrophils (%) (Auto) 75.4H, 

Lymphocytes (%) (Auto) 13.8L, Monocytes (%) (Auto) 10.0, Eosinophils (%) (Auto) 

0.3, Basophils (%) (Auto) 0.5, Sodium Level 140, Potassium Level 3.2L, Chloride 

Level 103, Carbon Dioxide Level 22, Anion Gap 15, Blood Urea Nitrogen 18, 

Creatinine 1.0, Estimat Glomerular Filtration Rate > 60, Glucose Level 71L, 

Calcium Level 9.4


Height (Feet):  5


Height (Inches):  5.00


Weight (Pounds):  230


Respiratory/Chest:  lungs clear


Abdomen:  soft











Lata Borges MD Apr 3, 2019 13:30

## 2019-04-03 NOTE — NUR
NURSE NOTES:

Required pt teaching for wound care pt did not recall that wound care was rendered. Pt is 
ambulatory, call light in reach for safety. Pt is forgetful and does not have clear recall 
of actual events. Current plan of care will be followed

## 2019-04-03 NOTE — CONSULTATION
Consult Note


Consult Note


Patient reports that she had gastric bypass surgery last Tuesday





Had a phone call follow-up with her nurse several days ago which she had 

discomfort in the lower abdomen for


She was reassured


Today however she noticed that there was some oozing from the left lower 

abdominal area





Denies any fevers or chills denies any chest pain or short of breath denies any 

vomiting or diarrhea


 


     No Known Allergies (Unverified , 4/2/19)














Past Medical History:  No History, Except For


Hx Hypertension:  Yes


Hx Asthma:  Yes


Hx Gastrointestinal Problems:  Yes - Gastric Bypass





interviewed


data reviewed





.


Assessment/Plan





Electrolyte Imbalance


Anemia


infected surgical wound








IV fluid


Antibiotics


Anemia bragg


check Ua











Hilario Mi MD Apr 3, 2019 14:26

## 2019-04-03 NOTE — GI INITIAL CONSULT NOTE
History of Present Illness


General


Date patient seen:  Apr 3, 2019


Time patient seen:  11:33


Reason for Hospitalization:  General Complaint


Referring physician:  ED Capps


Reason for Consultation:  ABDOMINAL PAIN





Present Illness


HPI


Patient reports that she had gastric bypass surgery last Tuesday


Had a phone call follow-up with her nurse several days ago which she had 

discomfort in the lower abdomen for


Today however she noticed that there was some oozing from the left lower 

abdominal area


Denies any fevers or chills denies any chest pain or short of breath denies any 

vomiting or diarrhea


GI consulted for abdominal pain.  Patient seen, awake alert oriented x4, 

reported initial abdominal pain which is resided at this time.  Patient has a 

recent history of gastric bypass last week when she began to notice redness and 

drainage from her incision site.  The abdomen was assessed, noted at the 

incision site with erythema.  Wound culture was obtained last night, pending 

final pathology.  Labs reviewed; WBC of 15, hemoglobin of 11.  The patient had 

an endoscopy and colonoscopy prior to having her gastric bypass surgery.


Home Meds


Reported Medications


Topiramate* (TOPAMAX*) 100 Mg Tablet, 100 MG ORAL TWICE A DAY, #60 TAB 0 Refills


   4/2/19


Nabumetone (Nabumetone) 500 Mg Tablet, 750 MG PO TWICE A DAY, TAB


   4/2/19


Losartan/Hydrochlorothiazide (LOSARTAN-HCTZ 100-12.5 MG TAB) 1 Each Tablet, 1 

TAB ORAL DAILY, TAB


   4/2/19


Hydrocodone Bit/Acetaminophen * (HYDROCODON-ACETAMINOPHN *) 1 Each 

Tablet, 1 TAB ORAL Q6H, TAB


   4/2/19


Gabapentin* (GABAPENTIN*) 600 Mg Tablet, 600 MG ORAL THREE TIMES A DAY, TAB


   4/2/19


Escitalopram Oxalate* (LEXAPRO*) 20 Mg Tablet, 20 MG ORAL DAILY, TAB


   4/2/19


Docusate Sodium* (DOCUSATE SODIUM*) 100 Mg Capsule, 100 MG ORAL DAILY, CAP


   4/2/19


Baclofen* (BACLOFEN*) 10 Mg Tablet, 10 MG ORAL THREE TIMES A DAY, TAB


   4/2/19


Atorvastatin Calcium* (LIPITOR*) 10 Mg Tablet, 10 MG ORAL DAILY, #30 TAB 0 

Refills


   4/2/19


Amlodipine Besylate* (AMLODIPINE BESYLATE*) 10 Mg Tablet, 10 MG ORAL DAILY, TAB


   4/2/19


Med list reviewed/reconciled:  Yes


Allergies:  


Coded Allergies:  


     No Known Allergies (Unverified , 4/2/19)





Patient History


History Provided By:  Patient, Medical Record


[facility] Narrative


Past Medical History:  No History, Except For


Hx Hypertension:  Yes


Hx Asthma:  Yes


Hx Gastrointestinal Problems:  Yes - Gastric Bypass


Social History:  Denies: smoking, alcohol use, drug use, other





Review of Systems


All Other Systems:  negative except mentioned in HPI





Physical Exam





Vital Signs








  Date Time  Temp Pulse Resp B/P (MAP) Pulse Ox O2 Delivery O2 Flow Rate FiO2


 


4/2/19 12:14 97.5 95 20 107/61 100 Room Air  








Sp02 EP Interpretation:  reviewed, normal


Labs





Laboratory Tests








Test


  4/2/19


13:30 4/2/19


13:58 4/3/19


05:35


 


White Blood Count


  19.1 K/UL


(4.8-10.8)  H 


  15.1 K/UL


(4.8-10.8)  H


 


Red Blood Count


  4.05 M/UL


(4.20-5.40)  L 


  3.56 M/UL


(4.20-5.40)  L


 


Hemoglobin


  13.0 G/DL


(12.0-16.0) 


  11.3 G/DL


(12.0-16.0)  L


 


Hematocrit


  39.5 %


(37.0-47.0) 


  35.0 %


(37.0-47.0)  L


 


Mean Corpuscular Volume 97 FL (80-99)    98 FL (80-99)  


 


Mean Corpuscular Hemoglobin


  32.0 PG


(27.0-31.0)  H 


  31.7 PG


(27.0-31.0)  H


 


Mean Corpuscular Hemoglobin


Concent 32.9 G/DL


(32.0-36.0) 


  32.3 G/DL


(32.0-36.0)


 


Red Cell Distribution Width


  11.7 %


(11.6-14.8) 


  11.3 %


(11.6-14.8)  L


 


Platelet Count


  264 K/UL


(150-450) 


  267 K/UL


(150-450)


 


Mean Platelet Volume


  7.0 FL


(6.5-10.1) 


  6.7 FL


(6.5-10.1)


 


Neutrophils (%) (Auto)


  % (45.0-75.0)


  


  75.4 %


(45.0-75.0)  H


 


Lymphocytes (%) (Auto)


  % (20.0-45.0)


  


  13.8 %


(20.0-45.0)  L


 


Monocytes (%) (Auto)


   % (1.0-10.0)  


  


  10.0 %


(1.0-10.0)


 


Eosinophils (%) (Auto)


   % (0.0-3.0)  


  


  0.3 %


(0.0-3.0)


 


Basophils (%) (Auto)


   % (0.0-2.0)  


  


  0.5 %


(0.0-2.0)


 


Differential Total Cells


Counted 100  


  


  


 


 


Neutrophils % (Manual) 83 % (45-75)  H  


 


Lymphocytes % (Manual) 12 % (20-45)  L  


 


Monocytes % (Manual) 5 % (1-10)    


 


Eosinophils % (Manual) 0 % (0-3)    


 


Basophils % (Manual) 0 % (0-2)    


 


Band Neutrophils 0 % (0-8)    


 


Platelet Estimate Adequate    


 


Platelet Morphology Normal    


 


Lactic Acid Level


  0.70 mmol/L


(0.4-2.0) 


  


 


 


Sodium Level


  


  144 MMOL/L


(136-145) 140 MMOL/L


(136-145)


 


Potassium Level


  


  3.1 MMOL/L


(3.5-5.1)  L 3.2 MMOL/L


(3.5-5.1)  L


 


Chloride Level


  


  104 MMOL/L


() 103 MMOL/L


()


 


Carbon Dioxide Level


  


  22 MMOL/L


(21-32) 22 MMOL/L


(21-32)


 


Anion Gap


  


  18 mmol/L


(5-15)  H 15 mmol/L


(5-15)


 


Blood Urea Nitrogen


  


  24 mg/dL


(7-18)  H 18 mg/dL


(7-18)


 


Creatinine


  


  1.3 MG/DL


(0.55-1.30) 1.0 MG/DL


(0.55-1.30)


 


Estimat Glomerular Filtration


Rate 


  50.8 mL/min


(>60) > 60 mL/min


(>60)


 


Glucose Level


  


  84 MG/DL


() 71 MG/DL


()  L


 


Calcium Level


  


  8.5 MG/DL


(8.5-10.1) 9.4 MG/DL


(8.5-10.1)


 


Total Bilirubin


  


  0.8 MG/DL


(0.2-1.0) 


 


 


Aspartate Amino Transf


(AST/SGOT) 


  16 U/L (15-37)


  


 


 


Alanine Aminotransferase


(ALT/SGPT) 


  21 U/L (12-78)


  


 


 


Alkaline Phosphatase


  


  58 U/L


() 


 


 


Total Protein


  


  6.9 G/DL


(6.4-8.2) 


 


 


Albumin


  


  2.5 G/DL


(3.4-5.0)  L 


 


 


Globulin  4.4 g/dL   


 


Albumin/Globulin Ratio


  


  0.6 (1.0-2.7)


L 


 








General Appearance:  well appearing, no apparent distress, alert


Head:  normocephalic


EENT:  PERRL/EOMI, normal ENT inspection


Neck:  supple


Respiratory:  normal breath sounds, no respiratory distress


Cardiovascular:  normal rate


Gastrointestinal:  normal inspection, non tender, soft, normal bowel sounds, non

-distended, other - Incision site, see HPI.


Rectal:  deferred


Genitourinary:  no CVA tenderness


Musculoskeletal:  normal inspection, back normal


Neurologic:  normal inspection, alert, oriented x3, responsive


Psychiatric:  normal inspection, judgement/insight normal, memory normal


Skin:  normal inspection, normal color, no rash, warm/dry, palpation normal, 

well hydrated


Lymphatic:  normal inspection, no adenopathy


Current Medications





Current Medications








 Medications


  (Trade)  Dose


 Ordered  Sig/Justin


 Route


 PRN Reason  Start Time


 Stop Time Status Last Admin


Dose Admin


 


 Acetaminophen


  (Tylenol)  500 mg  Q4H  PRN


 ORAL


 Mild Pain/Temp > 100.5  4/2/19 20:15


 5/2/19 20:14   


 


 


 Acetaminophen/


 Hydrocodone Bitart


  (Norco 10/325)  1 tab  Q4H  PRN


 ORAL


 severe pain   4/3/19 09:15


 4/10/19 09:14  4/3/19 09:42


 


 


 Amlodipine


 Besylate


  (Norvasc)  10 mg  DAILY


 ORAL


   4/3/19 09:00


 5/3/19 08:59   


 


 


 Baclofen


  (Lioresal)  10 mg  Q8H  PRN


 ORAL


 Muscle Spasm  4/2/19 20:15


 5/2/19 20:14   


 


 


 Clonidine HCl


  (Catapres Tab)  0.1 mg  Q4H  PRN


 ORAL


 For High Blood Pressure  4/2/19 20:00


 5/2/19 19:59   


 


 


 Docusate Sodium


  (Colace)  100 mg  DAILY


 ORAL


   4/2/19 20:15


 5/2/19 20:14  4/3/19 09:40


 


 


 Escitalopram


 Oxalate


  (Lexapro)  20 mg  DAILY


 ORAL


   4/3/19 09:00


 5/3/19 08:59  4/3/19 09:41


 


 


 Gabapentin


  (Neurontin)  600 mg  THREE TIMES A  DAY


 ORAL


   4/3/19 09:15


 5/3/19 09:14  4/3/19 09:45


 


 


 Ondansetron HCl


  (Zofran)  4 mg  Q6H  PRN


 IVP


 Nausea & Vomiting  4/2/19 20:45


 5/2/19 20:44   


 


 


 Piperacillin Sod/


 Tazobactam Sod


 3.375 gm/Sodium


 Chloride  110 ml @ 


 27.5 mls/hr  Q8H


 IVPB


   4/3/19 11:00


 4/10/19 10:59  4/3/19 10:46


 


 


 Potassium Chloride


  (K-Dur)  40 meq  DAILY


 ORAL


   4/3/19 09:00


 5/3/19 08:59  4/3/19 09:46


 


 


 Sodium Chloride  1,000 ml @ 


 65 mls/hr  [id number]


 IV


   4/2/19 20:00


 5/2/19 19:59  4/2/19 21:31


 


 


 Topiramate


  (Topamax)  100 mg  DAILY


 ORAL


   4/2/19 20:15


 5/2/19 20:14  4/3/19 09:40


 











GI: Plan


Problems:  


(1) Anemia


(2) History of gastric bypass


(3) Abscess


(4) Surgical site infection


Plan


Follow-up wound cultures


Recommend for ID consultation


Clear liquid diet, advance as tolerated


Surgical site dressing changes twice daily per surgery


Pain management


anemia work up


OB stool r/o GI bleed


monitor H&H, prn transfusions


bowel regime


ppi


Zofran as needed


fu labs





Discussed with Dr. Mancia.


Thank you for this patient referral, we will follow.





The patient was seen and examined at bedside and all new and available data was 

reviewed in the patients chart. I agree with the above findings, impression 

and plan.  (Patient seen earlier today. Signature stamp does not reflect 

patient encounter time.). - MD Mariama Aaron,[facility] NP Apr 3, 2019 11:37

## 2019-04-03 NOTE — NUR
*-* INSURANCE *-*



CLINICALS AND REVIEW HAVE BEEN FAXED TO:



[facility] GROUP: 

F:[phone]

## 2019-04-03 NOTE — GENERAL SURGERY PROGRESS NOTE
General Surgery-Progress Note


Subjective


Symptoms:  improved





Objective





Last 24 Hour Vital Signs








  Date Time  Temp Pulse Resp B/P (MAP) Pulse Ox O2 Delivery O2 Flow Rate FiO2


 


4/3/19 09:47  90  107/75    


 


4/3/19 04:00 98.4 87 19 106/69 (81) 97   


 


4/2/19 23:54 98.0 80 19 125/86 (99) 96   


 


4/2/19 21:00      Room Air  


 


4/2/19 20:00 98.2 85 17 127/83 (98) 97   


 


4/2/19 17:17      Room Air  


 


4/2/19 14:35 97.7 88 17 126/66 100 Room Air  


 


4/2/19 12:35 97.5 89 13 91/68 98 Room Air  


 


4/2/19 12:20  89 13   Room Air  








I&O











Intake and Output  


 


 4/2/19 4/3/19





 19:00 07:00


 


Intake Total 1385 ml 585 ml


 


Balance 1385 ml 585 ml


 


  


 


Intake Oral 0 ml 


 


IV Total 1385 ml 585 ml


 


# Voids  3








Wound:  other - open no drainage


Respiratory:  clear


Abdomen:  soft, non-tender, present bowel sounds


Extremities:  no tenderness





Laboratory Tests








Test


  4/2/19


13:30 4/2/19


13:58 4/3/19


05:35


 


White Blood Count


  19.1 K/UL


(4.8-10.8)  H 


  15.1 K/UL


(4.8-10.8)  H


 


Red Blood Count


  4.05 M/UL


(4.20-5.40)  L 


  3.56 M/UL


(4.20-5.40)  L


 


Hemoglobin


  13.0 G/DL


(12.0-16.0) 


  11.3 G/DL


(12.0-16.0)  L


 


Hematocrit


  39.5 %


(37.0-47.0) 


  35.0 %


(37.0-47.0)  L


 


Mean Corpuscular Volume 97 FL (80-99)    98 FL (80-99)  


 


Mean Corpuscular Hemoglobin


  32.0 PG


(27.0-31.0)  H 


  31.7 PG


(27.0-31.0)  H


 


Mean Corpuscular Hemoglobin


Concent 32.9 G/DL


(32.0-36.0) 


  32.3 G/DL


(32.0-36.0)


 


Red Cell Distribution Width


  11.7 %


(11.6-14.8) 


  11.3 %


(11.6-14.8)  L


 


Platelet Count


  264 K/UL


(150-450) 


  267 K/UL


(150-450)


 


Mean Platelet Volume


  7.0 FL


(6.5-10.1) 


  6.7 FL


(6.5-10.1)


 


Neutrophils (%) (Auto)


  % (45.0-75.0)


  


  75.4 %


(45.0-75.0)  H


 


Lymphocytes (%) (Auto)


  % (20.0-45.0)


  


  13.8 %


(20.0-45.0)  L


 


Monocytes (%) (Auto)


   % (1.0-10.0)  


  


  10.0 %


(1.0-10.0)


 


Eosinophils (%) (Auto)


   % (0.0-3.0)  


  


  0.3 %


(0.0-3.0)


 


Basophils (%) (Auto)


   % (0.0-2.0)  


  


  0.5 %


(0.0-2.0)


 


Differential Total Cells


Counted 100  


  


  


 


 


Neutrophils % (Manual) 83 % (45-75)  H  


 


Lymphocytes % (Manual) 12 % (20-45)  L  


 


Monocytes % (Manual) 5 % (1-10)    


 


Eosinophils % (Manual) 0 % (0-3)    


 


Basophils % (Manual) 0 % (0-2)    


 


Band Neutrophils 0 % (0-8)    


 


Platelet Estimate Adequate    


 


Platelet Morphology Normal    


 


Lactic Acid Level


  0.70 mmol/L


(0.4-2.0) 


  


 


 


Sodium Level


  


  144 MMOL/L


(136-145) 140 MMOL/L


(136-145)


 


Potassium Level


  


  3.1 MMOL/L


(3.5-5.1)  L 3.2 MMOL/L


(3.5-5.1)  L


 


Chloride Level


  


  104 MMOL/L


() 103 MMOL/L


()


 


Carbon Dioxide Level


  


  22 MMOL/L


(21-32) 22 MMOL/L


(21-32)


 


Anion Gap


  


  18 mmol/L


(5-15)  H 15 mmol/L


(5-15)


 


Blood Urea Nitrogen


  


  24 mg/dL


(7-18)  H 18 mg/dL


(7-18)


 


Creatinine


  


  1.3 MG/DL


(0.55-1.30) 1.0 MG/DL


(0.55-1.30)


 


Estimat Glomerular Filtration


Rate 


  50.8 mL/min


(>60) > 60 mL/min


(>60)


 


Glucose Level


  


  84 MG/DL


() 71 MG/DL


()  L


 


Calcium Level


  


  8.5 MG/DL


(8.5-10.1) 9.4 MG/DL


(8.5-10.1)


 


Total Bilirubin


  


  0.8 MG/DL


(0.2-1.0) 


 


 


Aspartate Amino Transf


(AST/SGOT) 


  16 U/L (15-37)


  


 


 


Alanine Aminotransferase


(ALT/SGPT) 


  21 U/L (12-78)


  


 


 


Alkaline Phosphatase


  


  58 U/L


() 


 


 


Total Protein


  


  6.9 G/DL


(6.4-8.2) 


 


 


Albumin


  


  2.5 G/DL


(3.4-5.0)  L 


 


 


Globulin  4.4 g/dL   


 


Albumin/Globulin Ratio


  


  0.6 (1.0-2.7)


L 


 











Assessment


Additional Comments


S/P infected wound





Plan


Additional Comments


pack the wound











Anthony Davis MD Apr 3, 2019 12:16
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
## 2019-04-03 NOTE — NUR
CASE MANAGEMENT:REVIEW



4/3/19

SI: SURGICAL SITE INFECTION

S/P GASTRIC BYPASS

98.7   87  18  117/71  94% ON RA

WBC+15.1   K-3.2   GLUCOSE-71



IS: IV VANCOMYCIN Q12

IV ZOSYN Q8HRS

IVF@50/HR

NORCO PO Q4HRS PRN

**: MED/SURG STATUS



PLAN:

WOUND PACKING

PATIENT MAY NEED PLACEMENT UPON DISCHARGE FOR COMPLEX WOUND PACKING

PACK WOUND IN LUQ TWICE A DAY WITH BETADINE SOAKED SPONGE

WOUND IS VERY DEEP SO MAKE SURE PACKING GOES TO BOTTOM OF WOUND

## 2019-04-03 NOTE — HISTORY AND PHYSICAL REPORT
DATE OF ADMISSION:  04/02/2019

HISTORY OF PRESENT ILLNESS:  The patient comes here and is being admitted

for multiple reasons, mainly for wound dehiscence and infection to the

gastric surgical site that she just had two days ago.  Apparently, _____

according to the ER doctor, has been consulted for surgery.  The patient

also reported chills and she stated that it smelled like sulfur and was

leaking from the wound site.  The patient has morbid obesity.  Denies

nausea, vomiting, or diarrhea.  Denies shortness of breath or cough.



PAST MEDICAL HISTORY:  History of morbid obesity, history of gallstones,

hyperlipidemia, constipation, chronic pain syndrome, back pain, morbid

obesity, and hypertension.



PAST SURGICAL HISTORY:  Back surgery and gastric bypass x2.



ALLERGIES:  No known allergies.



MEDICATIONS:  Norvasc, baclofen, Colace, Lexapro, gabapentin,

hydrochlorothiazide, and Topamax.



FAMILY HISTORY:  Noncontributory.



SOCIAL HISTORY:  Denies history of smoking, alcohol, or illicit drugs.



REVIEW OF SYSTEMS:  HEENT:  Denies headaches.  RESPIRATORY:  Denies

shortness of breath.  Denies cough.  CARDIOVASCULAR:  Denies chest pain.

No orthopnea.  GASTROINTESTINAL:  She does have some mild abdominal pain

at the surgical site.  CNS:  No change in speech pattern.  Feels weak.



PHYSICAL EXAMINATION:

VITAL SIGNS:  Temperature 97.5, pulse is 95, blood pressure is 107/61.

HEENT:  PERRLA.

NECK:  Supple.  No lymphadenopathy.

CHEST:  Clear to auscultation.

CARDIOVASCULAR:  Regular rate and rhythm.

GASTROINTESTINAL:  There is wound dehiscence at the old surgical site and

there is drainage at the surgical site.  Morbidly obese.  Abdomen is soft.

Positive bowel sounds.

EXTREMITIES:  1+ edema.  Reflexes are equal on both sides.  Generalized

weakness.



LABORATORY DATA:  WBC of 19.1, hemoglobin 13, and platelets of 264,000.

Sodium 144, potassium 2.1, chloride 104, BUN of 24, creatinine 1.3,

glucose of 84.



ASSESSMENT AND PLAN:

1. Hypokalemia.  I have asked Dr. Mi to see the patient for that

reason.

2. Infected surgical wound.  I have asked Dr. Maximus Mcnamara to see the

patient.  According to the consulted doctor, Dr. aDvis for surgical

consult to see the patient _____ surgical debridement.  I have asked Dr. Sparks for pain management.  Dr. Mi and Dr. Maximus Mcnamara has also

been consulted.









  ______________________________________________

  Lata Borges M.D.





DR:  MATTHEW

D:  04/02/2019 21:51

T:  04/03/2019 03:11

JOB#:  [id number]

CC:

## 2019-04-03 NOTE — NUR
NURSE NOTES:

Remains NPO , received awake , no complaints of pain at this time. Breathing room air. Pt is 
ambulatory. Dressing changed previous shift

## 2019-04-03 NOTE — CONSULTATION
History of Present Illness


General


Chief Complaint:  





Present Illness


Allergies:  


Coded Allergies:  


     No Known Allergies (Unverified , 4/2/19)





Medication History


Scheduled


Amlodipine Besylate* (Amlodipine Besylate*), 10 MG ORAL DAILY, (Reported)


Atorvastatin Calcium* (Lipitor*), 10 MG ORAL DAILY, (Reported)


Baclofen* (Baclofen*), 10 MG ORAL THREE TIMES A DAY, (Reported)


Docusate Sodium* (Docusate Sodium*), 100 MG ORAL DAILY, (Reported)


Escitalopram Oxalate* (Lexapro*), 20 MG ORAL DAILY, (Reported)


Gabapentin* (Gabapentin*), 600 MG ORAL THREE TIMES A DAY, (Reported)


Hydrocodone Bit/Acetaminophen * (Hydrocodon-Acetaminophn *), 1 TAB 

ORAL Q6H, (Reported)


Losartan/Hydrochlorothiazide (Losartan-Hctz 100-12.5 Mg Tab), 1 TAB ORAL DAILY, 

(Reported)


Nabumetone (Nabumetone), 750 MG PO TWICE A DAY, (Reported)


Topiramate* (Topamax*), 100 MG ORAL TWICE A DAY, (Reported)





Patient History


Healthcare decision maker


Mr. Llanes [phone]


Resuscitation status


Full Code


Advanced Directive on File


in pt chart





Physical Exam





Last 24 Hour Vital Signs








  Date Time  Temp Pulse Resp B/P (MAP) Pulse Ox O2 Delivery O2 Flow Rate FiO2


 


4/3/19 04:00 98.4 87 19 106/69 (81) 97   


 


4/2/19 23:54 98.0 80 19 125/86 (99) 96   


 


4/2/19 21:00      Room Air  


 


4/2/19 20:00 98.2 85 17 127/83 (98) 97   


 


4/2/19 17:17      Room Air  


 


4/2/19 14:35 97.7 88 17 126/66 100 Room Air  


 


4/2/19 12:35 97.5 89 13 91/68 98 Room Air  


 


4/2/19 12:20  89 13   Room Air  


 


4/2/19 12:14 97.5 95 20 107/61 100 Room Air  

















Intake and Output  


 


 4/2/19 4/3/19





 19:00 07:00


 


Intake Total 1385 ml 585 ml


 


Balance 1385 ml 585 ml


 


  


 


Intake Oral 0 ml 


 


IV Total 1385 ml 585 ml


 


# Voids  3











Laboratory Tests








Test


  4/2/19


13:30 4/2/19


13:58 4/3/19


05:35


 


White Blood Count


  19.1 K/UL


(4.8-10.8)  H 


  15.1 K/UL


(4.8-10.8)  H


 


Red Blood Count


  4.05 M/UL


(4.20-5.40)  L 


  3.56 M/UL


(4.20-5.40)  L


 


Hemoglobin


  13.0 G/DL


(12.0-16.0) 


  11.3 G/DL


(12.0-16.0)  L


 


Hematocrit


  39.5 %


(37.0-47.0) 


  35.0 %


(37.0-47.0)  L


 


Mean Corpuscular Volume 97 FL (80-99)    98 FL (80-99)  


 


Mean Corpuscular Hemoglobin


  32.0 PG


(27.0-31.0)  H 


  31.7 PG


(27.0-31.0)  H


 


Mean Corpuscular Hemoglobin


Concent 32.9 G/DL


(32.0-36.0) 


  32.3 G/DL


(32.0-36.0)


 


Red Cell Distribution Width


  11.7 %


(11.6-14.8) 


  11.3 %


(11.6-14.8)  L


 


Platelet Count


  264 K/UL


(150-450) 


  267 K/UL


(150-450)


 


Mean Platelet Volume


  7.0 FL


(6.5-10.1) 


  6.7 FL


(6.5-10.1)


 


Neutrophils (%) (Auto)


  % (45.0-75.0)


  


  75.4 %


(45.0-75.0)  H


 


Lymphocytes (%) (Auto)


  % (20.0-45.0)


  


  13.8 %


(20.0-45.0)  L


 


Monocytes (%) (Auto)


   % (1.0-10.0)  


  


  10.0 %


(1.0-10.0)


 


Eosinophils (%) (Auto)


   % (0.0-3.0)  


  


  0.3 %


(0.0-3.0)


 


Basophils (%) (Auto)


   % (0.0-2.0)  


  


  0.5 %


(0.0-2.0)


 


Differential Total Cells


Counted 100  


  


  


 


 


Neutrophils % (Manual) 83 % (45-75)  H  


 


Lymphocytes % (Manual) 12 % (20-45)  L  


 


Monocytes % (Manual) 5 % (1-10)    


 


Eosinophils % (Manual) 0 % (0-3)    


 


Basophils % (Manual) 0 % (0-2)    


 


Band Neutrophils 0 % (0-8)    


 


Platelet Estimate Adequate    


 


Platelet Morphology Normal    


 


Lactic Acid Level


  0.70 mmol/L


(0.4-2.0) 


  


 


 


Sodium Level


  


  144 MMOL/L


(136-145) 140 MMOL/L


(136-145)


 


Potassium Level


  


  3.1 MMOL/L


(3.5-5.1)  L 3.2 MMOL/L


(3.5-5.1)  L


 


Chloride Level


  


  104 MMOL/L


() 103 MMOL/L


()


 


Carbon Dioxide Level


  


  22 MMOL/L


(21-32) 22 MMOL/L


(21-32)


 


Anion Gap


  


  18 mmol/L


(5-15)  H 15 mmol/L


(5-15)


 


Blood Urea Nitrogen


  


  24 mg/dL


(7-18)  H 18 mg/dL


(7-18)


 


Creatinine


  


  1.3 MG/DL


(0.55-1.30) 1.0 MG/DL


(0.55-1.30)


 


Estimat Glomerular Filtration


Rate 


  50.8 mL/min


(>60) > 60 mL/min


(>60)


 


Glucose Level


  


  84 MG/DL


() 71 MG/DL


()  L


 


Calcium Level


  


  8.5 MG/DL


(8.5-10.1) 9.4 MG/DL


(8.5-10.1)


 


Total Bilirubin


  


  0.8 MG/DL


(0.2-1.0) 


 


 


Aspartate Amino Transf


(AST/SGOT) 


  16 U/L (15-37)


  


 


 


Alanine Aminotransferase


(ALT/SGPT) 


  21 U/L (12-78)


  


 


 


Alkaline Phosphatase


  


  58 U/L


() 


 


 


Total Protein


  


  6.9 G/DL


(6.4-8.2) 


 


 


Albumin


  


  2.5 G/DL


(3.4-5.0)  L 


 


 


Globulin  4.4 g/dL   


 


Albumin/Globulin Ratio


  


  0.6 (1.0-2.7)


L 


 








Height (Feet):  5


Height (Inches):  5.00


Weight (Pounds):  230


Medications





Current Medications








 Medications


  (Trade)  Dose


 Ordered  Sig/Justin


 Route


 PRN Reason  Start Time


 Stop Time Status Last Admin


Dose Admin


 


 Acetaminophen


  (Tylenol)  500 mg  Q4H  PRN


 ORAL


 Mild Pain/Temp > 100.5  4/2/19 20:15


 5/2/19 20:14   


 


 


 Acetaminophen/


 Hydrocodone Bitart


  ([place] 5/325)  1 tab  Q4H  PRN


 ORAL


 Moderate Pain (Pain Scale 4-6)  4/2/19 20:00


 4/9/19 19:59  4/3/19 00:41


 


 


 Amlodipine


 Besylate


  (Norvasc)  10 mg  DAILY


 ORAL


   4/3/19 09:00


 5/3/19 08:59   


 


 


 Baclofen


  (Lioresal)  10 mg  Q8H  PRN


 ORAL


 Muscle Spasm  4/2/19 20:15


 5/2/19 20:14   


 


 


 Clonidine HCl


  (Catapres Tab)  0.1 mg  Q4H  PRN


 ORAL


 For High Blood Pressure  4/2/19 20:00


 5/2/19 19:59   


 


 


 Docusate Sodium


  (Colace)  100 mg  DAILY


 ORAL


   4/2/19 20:15


 5/2/19 20:14  4/2/19 21:31


 


 


 Escitalopram


 Oxalate


  (Lexapro)  20 mg  DAILY


 ORAL


   4/3/19 09:00


 5/3/19 08:59   


 


 


 Ondansetron HCl


  (Zofran)  4 mg  Q6H  PRN


 IVP


 Nausea & Vomiting  4/2/19 20:45


 5/2/19 20:44   


 


 


 Potassium Chloride


  (K-Dur)  40 meq  DAILY


 ORAL


   4/3/19 09:00


 5/3/19 08:59   


 


 


 Sodium Chloride  1,000 ml @ 


 65 mls/hr  [id number]


 IV


   4/2/19 20:00


 5/2/19 19:59  4/2/19 21:31


 


 


 Topiramate


  (Topamax)  100 mg  DAILY


 ORAL


   4/2/19 20:15


 5/2/19 20:14  4/2/19 21:31


 











Assessment/Plan


Assessment/Plan


(1) Abdominal pain


(2) S/p gastric bypass revision


(3) Surgical wound infection


(4) Morbid obesity


seen dictated.











Som Roberts Apr 3, 2019 08:45

## 2019-04-03 NOTE — NUR
NURSE NOTES:

pt provided with potassium po as well as IV potassium for low potassium. Educated on high 
potassium containing foods. Verbalized understanding.

## 2019-04-04 VITALS — SYSTOLIC BLOOD PRESSURE: 117 MMHG | DIASTOLIC BLOOD PRESSURE: 77 MMHG

## 2019-04-04 VITALS — SYSTOLIC BLOOD PRESSURE: 118 MMHG | DIASTOLIC BLOOD PRESSURE: 77 MMHG

## 2019-04-04 VITALS — SYSTOLIC BLOOD PRESSURE: 123 MMHG | DIASTOLIC BLOOD PRESSURE: 82 MMHG

## 2019-04-04 VITALS — SYSTOLIC BLOOD PRESSURE: 112 MMHG | DIASTOLIC BLOOD PRESSURE: 75 MMHG

## 2019-04-04 VITALS — SYSTOLIC BLOOD PRESSURE: 105 MMHG | DIASTOLIC BLOOD PRESSURE: 65 MMHG

## 2019-04-04 VITALS — SYSTOLIC BLOOD PRESSURE: 100 MMHG | DIASTOLIC BLOOD PRESSURE: 67 MMHG

## 2019-04-04 LAB
% IRON SATURATION: 39 % (ref 15–50)
ADD MANUAL DIFF: NO
ALBUMIN SERPL-MCNC: 2.6 G/DL (ref 3.4–5)
ALBUMIN/GLOB SERPL: 0.5 {RATIO} (ref 1–2.7)
ALP SERPL-CCNC: 72 U/L (ref 46–116)
ALT SERPL-CCNC: 28 U/L (ref 12–78)
ANION GAP SERPL CALC-SCNC: 10 MMOL/L (ref 5–15)
APTT BLD: 29 SEC (ref 23–33)
AST SERPL-CCNC: 21 U/L (ref 15–37)
BASOPHILS NFR BLD AUTO: 1.1 % (ref 0–2)
BILIRUB SERPL-MCNC: 0.5 MG/DL (ref 0.2–1)
BUN SERPL-MCNC: 11 MG/DL (ref 7–18)
CALCIUM SERPL-MCNC: 9.2 MG/DL (ref 8.5–10.1)
CHLORIDE SERPL-SCNC: 107 MMOL/L (ref 98–107)
CHOLEST SERPL-MCNC: 139 MG/DL (ref ?–200)
CK SERPL-CCNC: 73 U/L (ref 26–308)
CO2 SERPL-SCNC: 25 MMOL/L (ref 21–32)
CREAT SERPL-MCNC: 1 MG/DL (ref 0.55–1.3)
EOSINOPHIL NFR BLD AUTO: 1.4 % (ref 0–3)
ERYTHROCYTE [DISTWIDTH] IN BLOOD BY AUTOMATED COUNT: 11.8 % (ref 11.6–14.8)
FERRITIN SERPL-MCNC: 278 NG/ML (ref 8–388)
GAMMA GLUTAMYL TRANSPEPTIDASE: 13 U/L (ref 5–85)
GLOBULIN SER-MCNC: 4.9 G/DL
HCT VFR BLD CALC: 32.1 % (ref 37–47)
HDLC SERPL-MCNC: 42 MG/DL (ref 40–60)
HGB BLD-MCNC: 10.3 G/DL (ref 12–16)
INR PPP: 1 (ref 0.9–1.1)
IRON SERPL-MCNC: 87 UG/DL (ref 50–175)
LYMPHOCYTES NFR BLD AUTO: 21.8 % (ref 20–45)
MCV RBC AUTO: 98 FL (ref 80–99)
MONOCYTES NFR BLD AUTO: 6.5 % (ref 1–10)
NEUTROPHILS NFR BLD AUTO: 69.3 % (ref 45–75)
PHOSPHATE SERPL-MCNC: 2.2 MG/DL (ref 2.5–4.9)
PLATELET # BLD: 303 K/UL (ref 150–450)
POTASSIUM SERPL-SCNC: 3.7 MMOL/L (ref 3.5–5.1)
RBC # BLD AUTO: 3.26 M/UL (ref 4.2–5.4)
SODIUM SERPL-SCNC: 142 MMOL/L (ref 136–145)
TIBC SERPL-MCNC: 221 UG/DL (ref 250–450)
TRIGL SERPL-MCNC: 72 MG/DL (ref 30–150)
UNSATURATED IRON BINDING: 134 UG/DL (ref 112–346)
WBC # BLD AUTO: 10 K/UL (ref 4.8–10.8)

## 2019-04-04 RX ADMIN — DOCUSATE SODIUM SCH MG: 100 CAPSULE, LIQUID FILLED ORAL at 17:11

## 2019-04-04 RX ADMIN — DEXTROSE MONOHYDRATE SCH MLS/HR: 50 INJECTION, SOLUTION INTRAVENOUS at 12:31

## 2019-04-04 RX ADMIN — HYDROCODONE BITARTRATE AND ACETAMINOPHEN PRN TAB: 10; 325 TABLET ORAL at 05:55

## 2019-04-04 RX ADMIN — DOCUSATE SODIUM SCH MG: 100 CAPSULE, LIQUID FILLED ORAL at 12:52

## 2019-04-04 RX ADMIN — DOCUSATE SODIUM SCH MG: 100 CAPSULE, LIQUID FILLED ORAL at 09:00

## 2019-04-04 RX ADMIN — DEXTROSE MONOHYDRATE SCH MLS/HR: 50 INJECTION, SOLUTION INTRAVENOUS at 19:14

## 2019-04-04 RX ADMIN — HYDROCODONE BITARTRATE AND ACETAMINOPHEN PRN TAB: 10; 325 TABLET ORAL at 10:00

## 2019-04-04 RX ADMIN — SODIUM CHLORIDE SCH MLS/HR: 9 INJECTION, SOLUTION INTRAVENOUS at 03:23

## 2019-04-04 RX ADMIN — DEXTROSE MONOHYDRATE SCH MLS/HR: 50 INJECTION, SOLUTION INTRAVENOUS at 05:50

## 2019-04-04 RX ADMIN — DEXTROSE, SODIUM CHLORIDE, AND POTASSIUM CHLORIDE SCH MLS/HR: 5; .45; .15 INJECTION INTRAVENOUS at 10:00

## 2019-04-04 RX ADMIN — SODIUM CHLORIDE SCH MLS/HR: 9 INJECTION, SOLUTION INTRAVENOUS at 17:10

## 2019-04-04 RX ADMIN — TOPIRAMATE SCH MG: 100 TABLET, COATED ORAL at 08:59

## 2019-04-04 RX ADMIN — HYDROCODONE BITARTRATE AND ACETAMINOPHEN PRN TAB: 10; 325 TABLET ORAL at 14:47

## 2019-04-04 RX ADMIN — HYDROCODONE BITARTRATE AND ACETAMINOPHEN PRN TAB: 10; 325 TABLET ORAL at 20:02

## 2019-04-04 NOTE — GENERAL SURGERY PROGRESS NOTE
General Surgery-Progress Note


Subjective


Symptoms:  improved, BM, pain decreased





Objective





Last 24 Hour Vital Signs








  Date Time  Temp Pulse Resp B/P (MAP) Pulse Ox O2 Delivery O2 Flow Rate FiO2


 


4/4/19 15:17 97.8       


 


4/4/19 12:00 97.8 80 18 112/75 (87) 96   


 


4/4/19 09:00      Room Air  


 


4/4/19 08:59  81  123/82    


 


4/4/19 08:00 98.6 81 18 123/82 (96) 96   


 


4/4/19 05:42 97.8 73 20 117/77 (90) 99   


 


4/4/19 00:50 98.6 79 18 105/65 (78) 94   


 


4/3/19 21:00      Room Air  


 


4/3/19 20:30 98.1 86 17 115/74 (88) 98   


 


4/3/19 19:15 98.5 112 16 117/68 (84) 95   


 


4/3/19 16:00 98.3 87 18 117/77 (90)    








I&O











Intake and Output  


 


 4/3/19 4/4/19





 19:00 07:00


 


Intake Total 1558.333 ml 1012.500 ml


 


Balance 1558.333 ml 1012.500 ml


 


  


 


Intake Oral 1200 ml 600 ml


 


IV Total 358.333 ml 412.500 ml


 


# Voids  3








Wound:  other - packing


Respiratory:  clear


Abdomen:  non-tender, present bowel sounds


Extremities:  no tenderness





Laboratory Tests








Test


  4/3/19


21:30 4/3/19


23:25 4/4/19


05:20


 


Prothrombin Time


  11.1 SEC


(9.30-11.50) 


  10.8 SEC


(9.30-11.50)


 


Prothromb Time International


Ratio 1.1 (0.9-1.1)  


  


  1.0 (0.9-1.1)  


 


 


Urine Color  Pale yellow   


 


Urine Appearance  Clear   


 


Urine pH  6.5 (4.5-8.0)   


 


Urine Specific Gravity


  


  1.010


(1.005-1.035) 


 


 


Urine Protein


  


  1+ (NEGATIVE)


H 


 


 


Urine Glucose (UA)


  


  Negative


(NEGATIVE) 


 


 


Urine Ketones


  


  4+ (NEGATIVE)


H 


 


 


Urine Blood


  


  Negative


(NEGATIVE) 


 


 


Urine Nitrite


  


  Negative


(NEGATIVE) 


 


 


Urine Bilirubin


  


  Negative


(NEGATIVE) 


 


 


Urine Urobilinogen


  


  Normal MG/DL


(0.0-1.0) 


 


 


Urine Leukocyte Esterase


  


  1+ (NEGATIVE)


H 


 


 


Urine RBC


  


  0-2 /HPF (0 -


2) 


 


 


Urine WBC


  


  0-2 /HPF (0 -


2) 


 


 


Urine Squamous Epithelial


Cells 


  Few /LPF


(NONE/OCC) 


 


 


Urine Bacteria


  


  Few /HPF


(NONE) 


 


 


White Blood Count


  


  


  10.0 K/UL


(4.8-10.8)


 


Red Blood Count


  


  


  3.26 M/UL


(4.20-5.40)  L


 


Hemoglobin


  


  


  10.3 G/DL


(12.0-16.0)  L


 


Hematocrit


  


  


  32.1 %


(37.0-47.0)  L


 


Mean Corpuscular Volume   98 FL (80-99)  


 


Mean Corpuscular Hemoglobin


  


  


  31.7 PG


(27.0-31.0)  H


 


Mean Corpuscular Hemoglobin


Concent 


  


  32.2 G/DL


(32.0-36.0)


 


Red Cell Distribution Width


  


  


  11.8 %


(11.6-14.8)


 


Platelet Count


  


  


  303 K/UL


(150-450)


 


Mean Platelet Volume


  


  


  6.6 FL


(6.5-10.1)


 


Neutrophils (%) (Auto)


  


  


  69.3 %


(45.0-75.0)


 


Lymphocytes (%) (Auto)


  


  


  21.8 %


(20.0-45.0)


 


Monocytes (%) (Auto)


  


  


  6.5 %


(1.0-10.0)


 


Eosinophils (%) (Auto)


  


  


  1.4 %


(0.0-3.0)


 


Basophils (%) (Auto)


  


  


  1.1 %


(0.0-2.0)


 


Reticulocyte Count


  


  


  1.7 %


(0.0-2.0)


 


Activated Partial


Thromboplast Time 


  


  29 SEC (23-33)


 


 


Sodium Level


  


  


  142 MMOL/L


(136-145)


 


Potassium Level


  


  


  3.7 MMOL/L


(3.5-5.1)


 


Chloride Level


  


  


  107 MMOL/L


()


 


Carbon Dioxide Level


  


  


  25 MMOL/L


(21-32)


 


Anion Gap


  


  


  10 mmol/L


(5-15)


 


Blood Urea Nitrogen


  


  


  11 mg/dL


(7-18)


 


Creatinine


  


  


  1.0 MG/DL


(0.55-1.30)


 


Estimat Glomerular Filtration


Rate 


  


  > 60 mL/min


(>60)


 


Glucose Level


  


  


  93 MG/DL


()


 


Hemoglobin A1c


  


  


  4.9 %


(4.3-6.0)


 


Uric Acid


  


  


  3.4 MG/DL


(2.6-7.2)


 


Calcium Level


  


  


  9.2 MG/DL


(8.5-10.1)


 


Phosphorus Level


  


  


  2.2 MG/DL


(2.5-4.9)  L


 


Magnesium Level


  


  


  2.0 MG/DL


(1.8-2.4)


 


Iron Level


  


  


  87 ug/dL


()


 


Total Iron Binding Capacity


  


  


  221 ug/dL


(250-450)  L


 


Percent Iron Saturation   39 % (15-50)  


 


Unsaturated Iron Binding


  


  


  134 ug/dL


(112-346)


 


Ferritin


  


  


  278 NG/ML


(8-388)


 


Total Bilirubin


  


  


  0.5 MG/DL


(0.2-1.0)


 


Gamma Glutamyl Transpeptidase   13 U/L (5-85)  


 


Aspartate Amino Transf


(AST/SGOT) 


  


  21 U/L (15-37)


 


 


Alanine Aminotransferase


(ALT/SGPT) 


  


  28 U/L (12-78)


 


 


Alkaline Phosphatase


  


  


  72 U/L


()


 


Total Creatine Kinase


  


  


  73 U/L


()


 


C-Reactive Protein,


Quantitative 


  


  19.6 mg/dL


(0.00-0.90)  H


 


Pro-B-Type Natriuretic Peptide


  


  


  39 pg/mL


(0-125)


 


Total Protein


  


  


  7.5 G/DL


(6.4-8.2)


 


Albumin


  


  


  2.6 G/DL


(3.4-5.0)  L


 


Globulin   4.9 g/dL  


 


Albumin/Globulin Ratio


  


  


  0.5 (1.0-2.7)


L


 


Triglycerides Level


  


  


  72 MG/DL


()


 


Cholesterol Level


  


  


  139 MG/DL (<


200)


 


LDL Cholesterol


  


  


  79 mg/dL


(<100)


 


HDL Cholesterol


  


  


  42 MG/DL


(40-60)


 


Cholesterol/HDL Ratio   3.3 (3.3-4.4)  


 


Lipase


  


  


  259 U/L


()


 


Carcinoembryonic Antigen   Pending  


 


Vitamin B12 Level


  


  


  > 2000 PG/ML


(193-986)  H


 


Folate


  


  


  20.4 NG/ML


(8.6-58.9)


 


Thyroid Stimulating Hormone


(TSH) 


  


  2.605 uiU/mL


(0.358-3.740)


 


Free Thyroxine


  


  


  1.09 NG/DL


(0.76-1.46)











Assessment


Additional Comments


infected wound





Plan


Additional Comments


continue packing











Anthony Davis MD Apr 4, 2019 15:58

## 2019-04-04 NOTE — CONSULTATION
DATE OF CONSULTATION:  04/03/2019



PAIN MANAGEMENT CONSULTATION



CONSULTING PHYSICIAN:  Behnoush Zarrini, M.D.



REFERRING PHYSICIAN:  Lata Borges M.D.



PHYSICIAN ASSISTANT:  MICHELLE Prater



CHIEF COMPLAINT:  Abdominal pain.



HISTORY OF PRESENT ILLNESS:  This is a 59 y/o female who is being seen on the 
med/surg floor of [facility] for initial pain management consultation. Patient is s/p 
gastric bypass revision on Tuesday 3/26/2019. She started to have severe 
abdominal pain found to have wound infection s/p drainage here in [facility]. She is 
on [place] 5/325mg PO 1 tab Q6H PRN with minimal pain relief, as outpt has been 
on Norco 10/325mg. Due to this were consulted so patient has adequate pain 
control while here in the hospital.



PAST MEDICAL HISTORY:  Morbid obesity   .



PAST SURGICAL HISTORY:  Gastric bypass.



SOCIAL HISTORY:  Denies tobacco, alcohol and IV drug abuse.



ALLERGIES:  NKDA.



MEDICATIONS:  Norco.



REVIEW OF SYSTEMS:  Complaining of pain in abdominal wall.  No fever.  No

chills.  No coughing.  No shortness of breath.  No chest pain.  No nausea.

No vomiting.  No diarrhea.



PHYSICAL EXAMINATION:

VITAL SIGNS:  Temperature 98.7, pulse 87, blood pressure 117/71.

GENERAL APPEARANCE:  No acute distress.  Well-developed, obese.

HEAD AND NECK:  Pink conjunctiva.

HEART:  S1, S2.  Regular.

LUNGS:  Clear.

ABDOMEN:  Soft.  There is a wound in the left lower quadrant.  No

significant drainage.

EXTREMITIES:  She has no edema.

NEUROLOGIC:  Awake, alert, oriented x3.  Ambulatory.



ASSESSMENT AND PLAN:  This is a 58-year-old female with abdominal pain,

status post gastric bypass revision, surgical wound infection and morbid 
obesity. 

The patient will be changed to Norco 10/325 one tablet every four hours as 
needed for

severe pain.  The patient was discussed with Dr. Sparks and Dr. Sparks

concurred.  We will follow the patient.







  ______________________________________________

  Behnoush Zarrini, M.D.





  ______________________________________________

  NAEL Prater





DR:  TRAVIS

D:  04/03/2019 23:09

T:  04/04/2019 11:13

JOB#:  [id number]

CC:



LLUVIA

## 2019-04-04 NOTE — NUR
NURSE NOTES:

Received pt from DARIN VIZCAINO. Pt is alert and orient x4. pt has RA. No SOB or acute 
respiratory distress noted. pt has intact iv access LFA 24g is running well. All needs 
attended, bed is locked and is in the lowest position. call light within easy reach. will 
continue to monitor.

## 2019-04-04 NOTE — GENERAL PROGRESS NOTE
Assessment/Plan


Assessment/Plan


(1) Abdominal pain


(2) S/p gastric bypass revision


(3) Surgical wound infection


(4) Morbid obesity


Patient will be continued on [place]


D/w Dr. Sparks and he concurred.





Subjective


Date patient seen:  Apr 4, 2019


Time patient seen:  04:15 - pm


Constitutional:  Reports: no symptoms


HEENT:  Reports: no symptoms


Cardiovascular:  Reports: no symptoms


Respiratory:  Reports: no symptoms


Gastrointestinal/Abdominal:  Reports: abdominal pain


Genitourinary:  Reports: no symptoms


Neurologic/Psychiatric:  Reports: no symptoms


Endocrine:  Reports: no symptoms


Hematologic/Lymphatic:  Reports: no symptoms


Allergies:  


Coded Allergies:  


     No Known Allergies (Unverified , 4/2/19)


Subjective


Patient is stable and tolerating the pain on the [place]. Family at bedside. She 

has no new complaints at this time.





Objective





Last 24 Hour Vital Signs








  Date Time  Temp Pulse Resp B/P (MAP) Pulse Ox O2 Delivery O2 Flow Rate FiO2


 


4/4/19 16:00 98.6 85 18 118/77 (91) 96   


 


4/4/19 15:17 97.8       


 


4/4/19 12:00 97.8 80 18 112/75 (87) 96   


 


4/4/19 09:00      Room Air  


 


4/4/19 08:59  81  123/82    


 


4/4/19 08:00 98.6 81 18 123/82 (96) 96   


 


4/4/19 05:42 97.8 73 20 117/77 (90) 99   


 


4/4/19 00:50 98.6 79 18 105/65 (78) 94   


 


4/3/19 21:00      Room Air  


 


4/3/19 20:30 98.1 86 17 115/74 (88) 98   


 


4/3/19 19:15 98.5 112 16 117/68 (84) 95   

















Intake and Output  


 


 4/3/19 4/4/19





 19:00 07:00


 


Intake Total 1558.333 ml 1012.500 ml


 


Balance 1558.333 ml 1012.500 ml


 


  


 


Intake Oral 1200 ml 600 ml


 


IV Total 358.333 ml 412.500 ml


 


# Voids  3








Laboratory Tests


4/3/19 21:30: 


Prothrombin Time 11.1, Prothromb Time International Ratio 1.1


4/3/19 23:25: 


Urine Color Pale yellow, Urine Appearance Clear, Urine pH 6.5, Urine Specific 

Gravity 1.010, Urine Protein 1+H, Urine Glucose (UA) Negative, Urine Ketones 4+H

, Urine Blood Negative, Urine Nitrite Negative, Urine Bilirubin Negative, Urine 

Urobilinogen Normal, Urine Leukocyte Esterase 1+H, Urine RBC 0-2, Urine WBC 0-2

, Urine Squamous Epithelial Cells Few, Urine Bacteria Few


4/4/19 05:20: 


Prothrombin Time 10.8, Prothromb Time International Ratio 1.0, White Blood 

Count 10.0, Red Blood Count 3.26L, Hemoglobin 10.3L, Hematocrit 32.1L, Mean 

Corpuscular Volume 98, Mean Corpuscular Hemoglobin 31.7H, Mean Corpuscular 

Hemoglobin Concent 32.2, Red Cell Distribution Width 11.8, Platelet Count 303, 

Mean Platelet Volume 6.6, Neutrophils (%) (Auto) 69.3, Lymphocytes (%) (Auto) 

21.8, Monocytes (%) (Auto) 6.5, Eosinophils (%) (Auto) 1.4, Basophils (%) (Auto

) 1.1, Reticulocyte Count 1.7, Activated Partial Thromboplast Time 29, Sodium 

Level 142, Potassium Level 3.7, Chloride Level 107, Carbon Dioxide Level 25, 

Anion Gap 10, Blood Urea Nitrogen 11, Creatinine 1.0, Estimat Glomerular 

Filtration Rate > 60, Glucose Level 93, Hemoglobin A1c 4.9, Uric Acid 3.4, 

Calcium Level 9.2, Phosphorus Level 2.2L, Magnesium Level 2.0, Iron Level 87, 

Total Iron Binding Capacity 221L, Percent Iron Saturation 39, Unsaturated Iron 

Binding 134, Ferritin 278, Total Bilirubin 0.5, Gamma Glutamyl Transpeptidase 13

, Aspartate Amino Transf (AST/SGOT) 21, Alanine Aminotransferase (ALT/SGPT) 28, 

Alkaline Phosphatase 72, Total Creatine Kinase 73, C-Reactive Protein, 

Quantitative 19.6H, Pro-B-Type Natriuretic Peptide 39, Total Protein 7.5, 

Albumin 2.6L, Globulin 4.9, Albumin/Globulin Ratio 0.5L, Triglycerides Level 72

, Cholesterol Level 139, LDL Cholesterol 79, HDL Cholesterol 42, Cholesterol/

HDL Ratio 3.3, Lipase 259, Carcinoembryonic Antigen [Pending], Vitamin B12 

Level > 2000H, Folate 20.4, Thyroid Stimulating Hormone (TSH) 2.605, Free 

Thyroxine 1.09


Height (Feet):  5


Height (Inches):  5.00


Weight (Pounds):  230


General Appearance:  no apparent distress, alert


EENT:  PERRL/EOMI, normal ENT inspection


Neck:  non-tender, normal alignment


Cardiovascular:  normal rate, regular rhythm


Respiratory/Chest:  lungs clear, normal breath sounds


Abdomen:  tender


Extremities:  non-tender, normal inspection


Edema:  no edema noted Arm (L), no edema noted Arm (R), no edema noted Leg (L), 

no edema noted Leg (R), no edema noted Pedal (L), no edema noted Pedal (R), no 

edema noted Generalized


Neurologic:  alert, oriented x 3


Skin:  warm/dry











Som Roberts Apr 4, 2019 17:05

## 2019-04-04 NOTE — NUR
NURSE NOTES:

patient received. patient in no acute distress at this time. patient complains of pain at 
this time. will follow orders. patient IV intact and asymptomatic. bed in lowest position 
and loced bed alarm on. will continue to monitor. none

## 2019-04-04 NOTE — INFECTIOUS DISEASES PROG NOTE
Assessment/Plan


Assessment/Plan


IMPRESSION:  


Surgical site infection, likely abscess that was drained by


the surgeon.  


hypokalemia, 


status post gastric bypass & revision,  


obesity, 


hypertension.





RECOMMENDATIONS:  


continue with vancomycin and Zosyn. 


will follow up the cultures.





Subjective


ROS Limited/Unobtainable:  No


Constitutional:  Reports: no symptoms


Respiratory:  Reports: no symptoms


Cardiovascular:  Reports: no symptoms


Gastrointestinal/Abdominal:  Reports: no symptoms


Genitourinary:  Reports: no symptoms


Musculoskeletal:  Reports: other - LLQ pain


Allergies:  


Coded Allergies:  


     No Known Allergies (Unverified , 4/2/19)





Objective


Vital Signs





Last 24 Hour Vital Signs








  Date Time  Temp Pulse Resp B/P (MAP) Pulse Ox O2 Delivery O2 Flow Rate FiO2


 


4/4/19 12:00 97.8 80 18 112/75 (87) 96   


 


4/4/19 10:30 98.6       


 


4/4/19 09:00      Room Air  


 


4/4/19 08:59  81  123/82    


 


4/4/19 08:00 98.6 81 18 123/82 (96) 96   


 


4/4/19 05:42 97.8 73 20 117/77 (90) 99   


 


4/4/19 00:50 98.6 79 18 105/65 (78) 94   


 


4/3/19 21:00      Room Air  


 


4/3/19 20:30 98.1 86 17 115/74 (88) 98   


 


4/3/19 19:15 98.5 112 16 117/68 (84) 95   


 


4/3/19 16:00 98.3 87 18 117/77 (90)    








Height (Feet):  5


Height (Inches):  5.00


Weight (Pounds):  230


General Appearance:  no acute distress


HEENT:  mucous membranes moist


Respiratory/Chest:  lungs clear


Cardiovascular:  normal rate


Abdomen:  soft, non tender


Extremities:  no edema


Skin:  other - wound in LLQ


Neurologic/Psychiatric:  alert, oriented x 3, responsive





Microbiology








 Date/Time


Source Procedure


Growth Status


 


 


 4/2/19 13:30


Blood Blood Culture - Preliminary


NO GROWTH AFTER 24 HOURS Resulted


 


 4/2/19 13:15


Blood Blood Culture - Preliminary


NO GROWTH AFTER 24 HOURS Resulted





 4/2/19 21:45


Abdomen Gram Stain - Final Resulted


 


 4/2/19 21:45


Abdomen Wound Culture


Pending Resulted











Laboratory Tests








Test


  4/3/19


21:30 4/3/19


23:25 4/4/19


05:20


 


Prothrombin Time


  11.1 SEC


(9.30-11.50) 


  10.8 SEC


(9.30-11.50)


 


Prothromb Time International


Ratio 1.1 (0.9-1.1)  


  


  1.0 (0.9-1.1)  


 


 


Urine Color  Pale yellow   


 


Urine Appearance  Clear   


 


Urine pH  6.5 (4.5-8.0)   


 


Urine Specific Gravity


  


  1.010


(1.005-1.035) 


 


 


Urine Protein


  


  1+ (NEGATIVE)


H 


 


 


Urine Glucose (UA)


  


  Negative


(NEGATIVE) 


 


 


Urine Ketones


  


  4+ (NEGATIVE)


H 


 


 


Urine Blood


  


  Negative


(NEGATIVE) 


 


 


Urine Nitrite


  


  Negative


(NEGATIVE) 


 


 


Urine Bilirubin


  


  Negative


(NEGATIVE) 


 


 


Urine Urobilinogen


  


  Normal MG/DL


(0.0-1.0) 


 


 


Urine Leukocyte Esterase


  


  1+ (NEGATIVE)


H 


 


 


Urine RBC


  


  0-2 /HPF (0 -


2) 


 


 


Urine WBC


  


  0-2 /HPF (0 -


2) 


 


 


Urine Squamous Epithelial


Cells 


  Few /LPF


(NONE/OCC) 


 


 


Urine Bacteria


  


  Few /HPF


(NONE) 


 


 


White Blood Count


  


  


  10.0 K/UL


(4.8-10.8)


 


Red Blood Count


  


  


  3.26 M/UL


(4.20-5.40)  L


 


Hemoglobin


  


  


  10.3 G/DL


(12.0-16.0)  L


 


Hematocrit


  


  


  32.1 %


(37.0-47.0)  L


 


Mean Corpuscular Volume   98 FL (80-99)  


 


Mean Corpuscular Hemoglobin


  


  


  31.7 PG


(27.0-31.0)  H


 


Mean Corpuscular Hemoglobin


Concent 


  


  32.2 G/DL


(32.0-36.0)


 


Red Cell Distribution Width


  


  


  11.8 %


(11.6-14.8)


 


Platelet Count


  


  


  303 K/UL


(150-450)


 


Mean Platelet Volume


  


  


  6.6 FL


(6.5-10.1)


 


Neutrophils (%) (Auto)


  


  


  69.3 %


(45.0-75.0)


 


Lymphocytes (%) (Auto)


  


  


  21.8 %


(20.0-45.0)


 


Monocytes (%) (Auto)


  


  


  6.5 %


(1.0-10.0)


 


Eosinophils (%) (Auto)


  


  


  1.4 %


(0.0-3.0)


 


Basophils (%) (Auto)


  


  


  1.1 %


(0.0-2.0)


 


Reticulocyte Count


  


  


  1.7 %


(0.0-2.0)


 


Activated Partial


Thromboplast Time 


  


  29 SEC (23-33)


 


 


Sodium Level


  


  


  142 MMOL/L


(136-145)


 


Potassium Level


  


  


  3.7 MMOL/L


(3.5-5.1)


 


Chloride Level


  


  


  107 MMOL/L


()


 


Carbon Dioxide Level


  


  


  25 MMOL/L


(21-32)


 


Anion Gap


  


  


  10 mmol/L


(5-15)


 


Blood Urea Nitrogen


  


  


  11 mg/dL


(7-18)


 


Creatinine


  


  


  1.0 MG/DL


(0.55-1.30)


 


Estimat Glomerular Filtration


Rate 


  


  > 60 mL/min


(>60)


 


Glucose Level


  


  


  93 MG/DL


()


 


Hemoglobin A1c


  


  


  4.9 %


(4.3-6.0)


 


Uric Acid


  


  


  3.4 MG/DL


(2.6-7.2)


 


Calcium Level


  


  


  9.2 MG/DL


(8.5-10.1)


 


Phosphorus Level


  


  


  2.2 MG/DL


(2.5-4.9)  L


 


Magnesium Level


  


  


  2.0 MG/DL


(1.8-2.4)


 


Iron Level


  


  


  87 ug/dL


()


 


Total Iron Binding Capacity


  


  


  221 ug/dL


(250-450)  L


 


Percent Iron Saturation   39 % (15-50)  


 


Unsaturated Iron Binding


  


  


  134 ug/dL


(112-346)


 


Ferritin


  


  


  278 NG/ML


(8-388)


 


Total Bilirubin


  


  


  0.5 MG/DL


(0.2-1.0)


 


Gamma Glutamyl Transpeptidase   13 U/L (5-85)  


 


Aspartate Amino Transf


(AST/SGOT) 


  


  21 U/L (15-37)


 


 


Alanine Aminotransferase


(ALT/SGPT) 


  


  28 U/L (12-78)


 


 


Alkaline Phosphatase


  


  


  72 U/L


()


 


Total Creatine Kinase


  


  


  73 U/L


()


 


C-Reactive Protein,


Quantitative 


  


  19.6 mg/dL


(0.00-0.90)  H


 


Pro-B-Type Natriuretic Peptide


  


  


  39 pg/mL


(0-125)


 


Total Protein


  


  


  7.5 G/DL


(6.4-8.2)


 


Albumin


  


  


  2.6 G/DL


(3.4-5.0)  L


 


Globulin   4.9 g/dL  


 


Albumin/Globulin Ratio


  


  


  0.5 (1.0-2.7)


L


 


Triglycerides Level


  


  


  72 MG/DL


()


 


Cholesterol Level


  


  


  139 MG/DL (<


200)


 


LDL Cholesterol


  


  


  79 mg/dL


(<100)


 


HDL Cholesterol


  


  


  42 MG/DL


(40-60)


 


Cholesterol/HDL Ratio   3.3 (3.3-4.4)  


 


Lipase


  


  


  259 U/L


()


 


Carcinoembryonic Antigen   Pending  


 


Vitamin B12 Level


  


  


  > 2000 PG/ML


(193-986)  H


 


Folate


  


  


  20.4 NG/ML


(8.6-58.9)


 


Thyroid Stimulating Hormone


(TSH) 


  


  2.605 uiU/mL


(0.358-3.740)


 


Free Thyroxine


  


  


  1.09 NG/DL


(0.76-1.46)











Current Medications








 Medications


  (Trade)  Dose


 Ordered  Sig/Justin


 Route


 PRN Reason  Start Time


 Stop Time Status Last Admin


Dose Admin


 


 Acetaminophen


  (Tylenol)  500 mg  Q4H  PRN


 ORAL


 Mild Pain/Temp > 100.5  4/2/19 20:15


 5/2/19 20:14   


 


 


 Acetaminophen/


 Hydrocodone Bitart


  (Norco 10/325)  1 tab  Q4H  PRN


 ORAL


 severe pain   4/3/19 09:15


 4/10/19 09:14  4/4/19 10:00


 


 


 Amlodipine


 Besylate


  (Norvasc)  2.5 mg  DAILY


 ORAL


   4/4/19 09:00


 5/3/19 08:59  4/4/19 08:59


 


 


 Baclofen


  (Lioresal)  10 mg  Q8H  PRN


 ORAL


 Muscle Spasm  4/2/19 20:15


 5/2/19 20:14   


 


 


 Clonidine HCl


  (Catapres Tab)  0.1 mg  Q4H  PRN


 ORAL


 For High Blood Pressure  4/2/19 20:00


 5/2/19 19:59   


 


 


 Dextrose/


 Electrolytes  1,000 ml @ 


 50 mls/hr  Q20H


 IV


   4/3/19 15:00


 5/3/19 14:59  4/4/19 10:00


 


 


 Docusate Sodium


  (Colace)  100 mg  TID


 ORAL


   4/3/19 18:00


 5/2/19 20:14  4/4/19 12:52


 


 


 Escitalopram


 Oxalate


  (Lexapro)  20 mg  DAILY


 ORAL


   4/3/19 09:00


 5/3/19 08:59  4/4/19 08:59


 


 


 Gabapentin


  (Neurontin)  600 mg  THREE TIMES A  DAY


 ORAL


   4/3/19 09:15


 5/3/19 09:14  4/4/19 12:52


 


 


 Ondansetron HCl


  (Zofran)  4 mg  Q6H  PRN


 IVP


 Nausea & Vomiting  4/2/19 20:45


 5/2/19 20:44   


 


 


 Piperacillin Sod/


 Tazobactam Sod


 3.375 gm/Sodium


 Chloride  110 ml @ 


 27.5 mls/hr  Q8H


 IVPB


   4/3/19 11:00


 4/10/19 10:59  4/4/19 12:31


 


 


 Potassium


 Phosphate 30 mm/


 Sodium Chloride  285 ml @ 


 47.5 mls/hr  ONCE  ONCE


 IV


   4/4/19 10:00


 4/4/19 15:59  4/4/19 09:50


 


 


 Potassium Chloride


  (K-Dur)  40 meq  DAILY


 ORAL


   4/3/19 09:00


 5/3/19 08:59  4/4/19 08:59


 


 


 Topiramate


  (Topamax)  100 mg  DAILY


 ORAL


   4/2/19 20:15


 5/2/19 20:14  4/4/19 08:59


 


 


 Vancomycin HCl


  (Vanco rx to


 dose)  1 ea  DAILY  PRN


 MISC


 Per rx protocol  4/3/19 14:15


 5/3/19 14:14   


 


 


 Vancomycin HCl


 750 mg/Sodium


 Chloride  275 ml @ 


 183.333


 mls/hr  Q12H


 IVPB


   4/3/19 16:00


 4/8/19 15:59  4/4/19 03:23


 

















Maximus Mcnamara MD Apr 4, 2019 13:51

## 2019-04-04 NOTE — NUR
CASE MANAGEMENT:REVIEW



4/4/19

SI: SURGICAL SITE INFECTION

S/P GASTRIC BYPASS

97.8   80  18  112/75  96% ON RA

H/H-10.3/32.1   PHOS-2.2



IS: IV VANCOMYCIN Q12

IV ZOSYN Q8HRS

IVF@50/HR

NORCO PO Q4HRS PRN

**: MED/SURG STATUS



PLAN:

WOUND PACKING

PATIENT MAY NEED PLACEMENT UPON DISCHARGE FOR COMPLEX WOUND PACKING

PACK WOUND IN LUQ TWICE A DAY WITH BETADINE SOAKED SPONGE

WOUND IS VERY DEEP SO MAKE SURE PACKING GOES TO BOTTOM OF WOUND



**UPON DISCHARGE PATIENT WOULD LIKE TO RETURN TO HER AREA

**SHE WILL NEED HOME HEALTH FOR WOUND CARE OR TO EITHER GO TO A NURSING HOME FOR WOUND CARE

## 2019-04-04 NOTE — NUR
*-* INSURANCE *-*



UPDATED CLINICALS HAVE BEEN FAXED TO:



[facility]: 

F:[phone]

## 2019-04-04 NOTE — GENERAL PROGRESS NOTE
Assessment/Plan


Assessment/Plan








Assessment and Recs:


# Anemia of chronic disease due to underlying chronic medical issues, 

multifactorial 


--> Anemia workup has been ordered/reviewed and no sig deficiency is noted


--> No evidence of hemolysis is noted, peripheral smear has been reviewed.


--> Hgb goal >7. Transfuse prn.


--> Epogen or iron at this time is not particularly indicated


--> Medications have been reviewed


--> evaluate with Gi team prn


--> transfuse if hgb is < 7 (will trend CBC daily)


# Leukocytosis/Elevated white blood cell count, unspecified likely related to 

underlying stress reaction, smoking v more likely infection from surgical site 

drainage


--> have reviewed peripheral smear and bandemia/neutrophilia noted


--> continue antibiotics if they have been started by ID team


--> monitor for resolution


# History of gastric bypass


--> surg franca prn


# Abscess/Surgical site infection


--> Follow-up wound cultures








The timing of this note does not necessarily reflect the time of the patient 

was seen.





Greatly appreciate consultation!





Subjective


Constitutional:  Denies: no symptoms, chills, diaphoresis, fever, malaise, 

weakness, other


HEENT:  Denies: no symptoms, eye pain, blurred vision, tearing, double vision, 

ear pain, ear discharge, nose pain, nose congestion, throat pain, throat 

swelling, mouth pain, mouth swelling, other


Respiratory:  Denies: no symptoms, cough, orthopnea, shortness of breath, SOB 

with excertion, SOB at rest, sputum, stridor, wheezing, other


Genitourinary:  Denies: no symptoms, burning, discharge, frequency, flank pain, 

hematuria, incontinence, pain, urgency, other


Neurologic/Psychiatric:  Denies: no symptoms, anxiety, depressed, emotional 

problems, headache, numbness, paresthesia, pre-existing deficit, seizure, 

tingling, tremors, weakness, other


Allergies:  


Coded Allergies:  


     No Known Allergies (Unverified , 4/2/19)


Subjective


4/4: better abdominal healing, less pain, labs reviewed





Objective





Last 24 Hour Vital Signs








  Date Time  Temp Pulse Resp B/P (MAP) Pulse Ox O2 Delivery O2 Flow Rate FiO2


 


4/4/19 16:00 98.6 85 18 118/77 (91) 96   


 


4/4/19 15:17 97.8       


 


4/4/19 12:00 97.8 80 18 112/75 (87) 96   


 


4/4/19 09:00      Room Air  


 


4/4/19 08:59  81  123/82    


 


4/4/19 08:00 98.6 81 18 123/82 (96) 96   


 


4/4/19 05:42 97.8 73 20 117/77 (90) 99   


 


4/4/19 00:50 98.6 79 18 105/65 (78) 94   


 


4/3/19 21:00      Room Air  


 


4/3/19 20:30 98.1 86 17 115/74 (88) 98   


 


4/3/19 19:15 98.5 112 16 117/68 (84) 95   

















Intake and Output  


 


 4/3/19 4/4/19





 19:00 07:00


 


Intake Total 1558.333 ml 1012.500 ml


 


Balance 1558.333 ml 1012.500 ml


 


  


 


Intake Oral 1200 ml 600 ml


 


IV Total 358.333 ml 412.500 ml


 


# Voids  3








Laboratory Tests


4/3/19 21:30: 


Prothrombin Time 11.1, Prothromb Time International Ratio 1.1


4/3/19 23:25: 


Urine Color Pale yellow, Urine Appearance Clear, Urine pH 6.5, Urine Specific 

Gravity 1.010, Urine Protein 1+H, Urine Glucose (UA) Negative, Urine Ketones 4+H

, Urine Blood Negative, Urine Nitrite Negative, Urine Bilirubin Negative, Urine 

Urobilinogen Normal, Urine Leukocyte Esterase 1+H, Urine RBC 0-2, Urine WBC 0-2

, Urine Squamous Epithelial Cells Few, Urine Bacteria Few


4/4/19 05:20: 


Prothrombin Time 10.8, Prothromb Time International Ratio 1.0, White Blood 

Count 10.0, Red Blood Count 3.26L, Hemoglobin 10.3L, Hematocrit 32.1L, Mean 

Corpuscular Volume 98, Mean Corpuscular Hemoglobin 31.7H, Mean Corpuscular 

Hemoglobin Concent 32.2, Red Cell Distribution Width 11.8, Platelet Count 303, 

Mean Platelet Volume 6.6, Neutrophils (%) (Auto) 69.3, Lymphocytes (%) (Auto) 

21.8, Monocytes (%) (Auto) 6.5, Eosinophils (%) (Auto) 1.4, Basophils (%) (Auto

) 1.1, Reticulocyte Count 1.7, Activated Partial Thromboplast Time 29, Sodium 

Level 142, Potassium Level 3.7, Chloride Level 107, Carbon Dioxide Level 25, 

Anion Gap 10, Blood Urea Nitrogen 11, Creatinine 1.0, Estimat Glomerular 

Filtration Rate > 60, Glucose Level 93, Hemoglobin A1c 4.9, Uric Acid 3.4, 

Calcium Level 9.2, Phosphorus Level 2.2L, Magnesium Level 2.0, Iron Level 87, 

Total Iron Binding Capacity 221L, Percent Iron Saturation 39, Unsaturated Iron 

Binding 134, Ferritin 278, Total Bilirubin 0.5, Gamma Glutamyl Transpeptidase 13

, Aspartate Amino Transf (AST/SGOT) 21, Alanine Aminotransferase (ALT/SGPT) 28, 

Alkaline Phosphatase 72, Total Creatine Kinase 73, C-Reactive Protein, 

Quantitative 19.6H, Pro-B-Type Natriuretic Peptide 39, Total Protein 7.5, 

Albumin 2.6L, Globulin 4.9, Albumin/Globulin Ratio 0.5L, Triglycerides Level 72

, Cholesterol Level 139, LDL Cholesterol 79, HDL Cholesterol 42, Cholesterol/

HDL Ratio 3.3, Lipase 259, Carcinoembryonic Antigen [Pending], Vitamin B12 

Level > 2000H, Folate 20.4, Thyroid Stimulating Hormone (TSH) 2.605, Free 

Thyroxine 1.09


Height (Feet):  5


Height (Inches):  5.00


Weight (Pounds):  230











Brennen Brooke MD Apr 4, 2019 16:27

## 2019-04-04 NOTE — GI PROGRESS NOTE
Assessment/Plan


Problems:  


(1) History of gastric bypass


ICD Codes:  Z98.84 - Bariatric surgery status


SNOMED:  943176263


(2) Surgical site infection


ICD Codes:  T81.49XA - Infection following a procedure, other surgical site, 

initial encounter


SNOMED:  61155842, 329389471


(3) Anemia


ICD Codes:  D64.9 - Anemia, unspecified


SNOMED:  805881327


(4) Surgical site infection


ICD Codes:  T81.49XA - Infection following a procedure, other surgical site, 

initial encounter


SNOMED:  23198692, 381144911


(5) Abscess


ICD Codes:  L02.91 - Cutaneous abscess, unspecified


SNOMED:  554453066


Status:  progressing


Status Narrative


Discussed with Dr. Mancai


Assessment/Plan


Recent history of gastric bypass


Anemia workup reviewed





Advance diet to pure and/or full liquid diet only given recent history of 

gastric bypass.


Follow-up ID recommendations


Surgical site dressing changes twice daily per surgery


Pain management


OB stool r/o GI bleed


monitor H&H, prn transfusions


Electrolyte correction


bowel regime


ppi


Zofran as needed


fu labs





The patient was seen and examined at bedside and all new and available data was 

reviewed in the patients chart. I agree with the above findings, impression 

and plan.  (Patient seen earlier today. Signature stamp does not reflect 

patient encounter time.). - Michael Mancia MD





Subjective


Subjective


hungry abdominal pain


Abdominal pain improved


has been getting dressing changes twice daily





Objective





Last 24 Hour Vital Signs








  Date Time  Temp Pulse Resp B/P (MAP) Pulse Ox O2 Delivery O2 Flow Rate FiO2


 


4/4/19 10:30 98.6       


 


4/4/19 09:00      Room Air  


 


4/4/19 08:59  81  123/82    


 


4/4/19 08:00 98.6 81 18 123/82 (96) 96   


 


4/4/19 05:42 97.8 73 20 117/77 (90) 99   


 


4/4/19 00:50 98.6 79 18 105/65 (78) 94   


 


4/3/19 21:00      Room Air  


 


4/3/19 20:30 98.1 86 17 115/74 (88) 98   


 


4/3/19 19:15 98.5 112 16 117/68 (84) 95   


 


4/3/19 16:00 98.3 87 18 117/77 (90)    


 


4/3/19 12:00 98.7 87 18 117/71 (86)    

















Intake and Output  


 


 4/3/19 4/4/19





 19:00 07:00


 


Intake Total 1558.333 ml 1012.500 ml


 


Balance 1558.333 ml 1012.500 ml


 


  


 


Intake Oral 1200 ml 600 ml


 


IV Total 358.333 ml 412.500 ml


 


# Voids  3











Laboratory Tests








Test


  4/3/19


21:30 4/3/19


23:25 4/4/19


05:20


 


Prothrombin Time


  11.1 SEC


(9.30-11.50) 


  10.8 SEC


(9.30-11.50)


 


Prothromb Time International


Ratio 1.1 (0.9-1.1)  


  


  1.0 (0.9-1.1)  


 


 


Urine Color  Pale yellow   


 


Urine Appearance  Clear   


 


Urine pH  6.5 (4.5-8.0)   


 


Urine Specific Gravity


  


  1.010


(1.005-1.035) 


 


 


Urine Protein


  


  1+ (NEGATIVE)


H 


 


 


Urine Glucose (UA)


  


  Negative


(NEGATIVE) 


 


 


Urine Ketones


  


  4+ (NEGATIVE)


H 


 


 


Urine Blood


  


  Negative


(NEGATIVE) 


 


 


Urine Nitrite


  


  Negative


(NEGATIVE) 


 


 


Urine Bilirubin


  


  Negative


(NEGATIVE) 


 


 


Urine Urobilinogen


  


  Normal MG/DL


(0.0-1.0) 


 


 


Urine Leukocyte Esterase


  


  1+ (NEGATIVE)


H 


 


 


Urine RBC


  


  0-2 /HPF (0 -


2) 


 


 


Urine WBC


  


  0-2 /HPF (0 -


2) 


 


 


Urine Squamous Epithelial


Cells 


  Few /LPF


(NONE/OCC) 


 


 


Urine Bacteria


  


  Few /HPF


(NONE) 


 


 


White Blood Count


  


  


  10.0 K/UL


(4.8-10.8)


 


Red Blood Count


  


  


  3.26 M/UL


(4.20-5.40)  L


 


Hemoglobin


  


  


  10.3 G/DL


(12.0-16.0)  L


 


Hematocrit


  


  


  32.1 %


(37.0-47.0)  L


 


Mean Corpuscular Volume   98 FL (80-99)  


 


Mean Corpuscular Hemoglobin


  


  


  31.7 PG


(27.0-31.0)  H


 


Mean Corpuscular Hemoglobin


Concent 


  


  32.2 G/DL


(32.0-36.0)


 


Red Cell Distribution Width


  


  


  11.8 %


(11.6-14.8)


 


Platelet Count


  


  


  303 K/UL


(150-450)


 


Mean Platelet Volume


  


  


  6.6 FL


(6.5-10.1)


 


Neutrophils (%) (Auto)


  


  


  69.3 %


(45.0-75.0)


 


Lymphocytes (%) (Auto)


  


  


  21.8 %


(20.0-45.0)


 


Monocytes (%) (Auto)


  


  


  6.5 %


(1.0-10.0)


 


Eosinophils (%) (Auto)


  


  


  1.4 %


(0.0-3.0)


 


Basophils (%) (Auto)


  


  


  1.1 %


(0.0-2.0)


 


Reticulocyte Count


  


  


  1.7 %


(0.0-2.0)


 


Activated Partial


Thromboplast Time 


  


  29 SEC (23-33)


 


 


Sodium Level


  


  


  142 MMOL/L


(136-145)


 


Potassium Level


  


  


  3.7 MMOL/L


(3.5-5.1)


 


Chloride Level


  


  


  107 MMOL/L


()


 


Carbon Dioxide Level


  


  


  25 MMOL/L


(21-32)


 


Anion Gap


  


  


  10 mmol/L


(5-15)


 


Blood Urea Nitrogen


  


  


  11 mg/dL


(7-18)


 


Creatinine


  


  


  1.0 MG/DL


(0.55-1.30)


 


Estimat Glomerular Filtration


Rate 


  


  > 60 mL/min


(>60)


 


Glucose Level


  


  


  93 MG/DL


()


 


Hemoglobin A1c


  


  


  4.9 %


(4.3-6.0)


 


Uric Acid


  


  


  3.4 MG/DL


(2.6-7.2)


 


Calcium Level


  


  


  9.2 MG/DL


(8.5-10.1)


 


Phosphorus Level


  


  


  2.2 MG/DL


(2.5-4.9)  L


 


Magnesium Level


  


  


  2.0 MG/DL


(1.8-2.4)


 


Iron Level


  


  


  87 ug/dL


()


 


Total Iron Binding Capacity


  


  


  221 ug/dL


(250-450)  L


 


Percent Iron Saturation   39 % (15-50)  


 


Unsaturated Iron Binding


  


  


  134 ug/dL


(112-346)


 


Ferritin


  


  


  278 NG/ML


(8-388)


 


Total Bilirubin


  


  


  0.5 MG/DL


(0.2-1.0)


 


Gamma Glutamyl Transpeptidase   13 U/L (5-85)  


 


Aspartate Amino Transf


(AST/SGOT) 


  


  21 U/L (15-37)


 


 


Alanine Aminotransferase


(ALT/SGPT) 


  


  28 U/L (12-78)


 


 


Alkaline Phosphatase


  


  


  72 U/L


()


 


Total Creatine Kinase


  


  


  73 U/L


()


 


C-Reactive Protein,


Quantitative 


  


  19.6 mg/dL


(0.00-0.90)  H


 


Pro-B-Type Natriuretic Peptide


  


  


  39 pg/mL


(0-125)


 


Total Protein


  


  


  7.5 G/DL


(6.4-8.2)


 


Albumin


  


  


  2.6 G/DL


(3.4-5.0)  L


 


Globulin   4.9 g/dL  


 


Albumin/Globulin Ratio


  


  


  0.5 (1.0-2.7)


L


 


Triglycerides Level


  


  


  72 MG/DL


()


 


Cholesterol Level


  


  


  139 MG/DL (<


200)


 


LDL Cholesterol


  


  


  79 mg/dL


(<100)


 


HDL Cholesterol


  


  


  42 MG/DL


(40-60)


 


Cholesterol/HDL Ratio   3.3 (3.3-4.4)  


 


Lipase


  


  


  259 U/L


()


 


Carcinoembryonic Antigen   Pending  


 


Vitamin B12 Level


  


  


  > 2000 PG/ML


(193-986)  H


 


Folate


  


  


  20.4 NG/ML


(8.6-58.9)


 


Thyroid Stimulating Hormone


(TSH) 


  


  2.605 uiU/mL


(0.358-3.740)


 


Free Thyroxine


  


  


  1.09 NG/DL


(0.76-1.46)








Height (Feet):  5


Height (Inches):  5.00


Weight (Pounds):  230


General Appearance:  WD/WN, no apparent distress, alert


Cardiovascular:  normal rate


Respiratory/Chest:  normal breath sounds, no respiratory distress


Abdominal Exam:  normal bowel sounds, non tender, soft


Extremities:  normal range of motion, non-tender











Katherine Leslie NP Apr 4, 2019 11:14

## 2019-04-04 NOTE — GENERAL PROGRESS NOTE
Assessment/Plan


Problem List:  


(1) Surgical site infection


ICD Codes:  T81.49XA - Infection following a procedure, other surgical site, 

initial encounter


SNOMED:  66413743, 118233309


(2) pain medication reveiw


(3) Anemia


ICD Codes:  D64.9 - Anemia, unspecified


SNOMED:  133500976


(4) Surgical site infection


ICD Codes:  T81.49XA - Infection following a procedure, other surgical site, 

initial encounter


SNOMED:  38020013, 505535654


Status:  progressing


Assessment/Plan


check lytes and h/h


no bleeding


obesity


s/p gastric bypass w surgical site infection]





Subjective


ROS Limited/Unobtainable:  Yes


Allergies:  


Coded Allergies:  


     No Known Allergies (Unverified , 4/2/19)





Objective





Last 24 Hour Vital Signs








  Date Time  Temp Pulse Resp B/P (MAP) Pulse Ox O2 Delivery O2 Flow Rate FiO2


 


4/4/19 20:00 98.7 86 20 100/67 (78) 95   


 


4/4/19 16:00 98.6 85 18 118/77 (91) 96   


 


4/4/19 15:17 97.8       


 


4/4/19 12:00 97.8 80 18 112/75 (87) 96   


 


4/4/19 09:00      Room Air  


 


4/4/19 08:59  81  123/82    


 


4/4/19 08:00 98.6 81 18 123/82 (96) 96   


 


4/4/19 05:42 97.8 73 20 117/77 (90) 99   


 


4/4/19 00:50 98.6 79 18 105/65 (78) 94   

















Intake and Output  


 


 4/3/19 4/4/19





 19:00 07:00


 


Intake Total 1558.333 ml 1012.500 ml


 


Balance 1558.333 ml 1012.500 ml


 


  


 


Intake Oral 1200 ml 600 ml


 


IV Total 358.333 ml 412.500 ml


 


# Voids  3








Laboratory Tests


4/3/19 23:25: 


Urine Color Pale yellow, Urine Appearance Clear, Urine pH 6.5, Urine Specific 

Gravity 1.010, Urine Protein 1+H, Urine Glucose (UA) Negative, Urine Ketones 4+H

, Urine Blood Negative, Urine Nitrite Negative, Urine Bilirubin Negative, Urine 

Urobilinogen Normal, Urine Leukocyte Esterase 1+H, Urine RBC 0-2, Urine WBC 0-2

, Urine Squamous Epithelial Cells Few, Urine Bacteria Few


4/4/19 05:20: 


White Blood Count 10.0, Red Blood Count 3.26L, Hemoglobin 10.3L, Hematocrit 

32.1L, Mean Corpuscular Volume 98, Mean Corpuscular Hemoglobin 31.7H, Mean 

Corpuscular Hemoglobin Concent 32.2, Red Cell Distribution Width 11.8, Platelet 

Count 303, Mean Platelet Volume 6.6, Neutrophils (%) (Auto) 69.3, Lymphocytes (%

) (Auto) 21.8, Monocytes (%) (Auto) 6.5, Eosinophils (%) (Auto) 1.4, Basophils (

%) (Auto) 1.1, Reticulocyte Count 1.7, Prothrombin Time 10.8, Prothromb Time 

International Ratio 1.0, Activated Partial Thromboplast Time 29, Sodium Level 

142, Potassium Level 3.7, Chloride Level 107, Carbon Dioxide Level 25, Anion 

Gap 10, Blood Urea Nitrogen 11, Creatinine 1.0, Estimat Glomerular Filtration 

Rate > 60, Glucose Level 93, Hemoglobin A1c 4.9, Uric Acid 3.4, Calcium Level 

9.2, Phosphorus Level 2.2L, Magnesium Level 2.0, Iron Level 87, Total Iron 

Binding Capacity 221L, Percent Iron Saturation 39, Unsaturated Iron Binding 134

, Ferritin 278, Total Bilirubin 0.5, Gamma Glutamyl Transpeptidase 13, 

Aspartate Amino Transf (AST/SGOT) 21, Alanine Aminotransferase (ALT/SGPT) 28, 

Alkaline Phosphatase 72, Total Creatine Kinase 73, C-Reactive Protein, 

Quantitative 19.6H, Pro-B-Type Natriuretic Peptide 39, Total Protein 7.5, 

Albumin 2.6L, Globulin 4.9, Albumin/Globulin Ratio 0.5L, Triglycerides Level 72

, Cholesterol Level 139, LDL Cholesterol 79, HDL Cholesterol 42, Cholesterol/

HDL Ratio 3.3, Lipase 259, Carcinoembryonic Antigen [Pending], Vitamin B12 

Level > 2000H, Folate 20.4, Thyroid Stimulating Hormone (TSH) 2.605, Free 

Thyroxine 1.09


Height (Feet):  5


Height (Inches):  5.00


Weight (Pounds):  230


Cardiovascular:  normal rate


Respiratory/Chest:  lungs clear


Abdomen:  soft











Lata Borges MD Apr 4, 2019 21:58

## 2019-04-04 NOTE — NUR
NURSE NOTES:

because iv ATB zosyn is still running, haven't administrated iv vancomycin. will continue to 
monitor.

## 2019-04-05 VITALS — DIASTOLIC BLOOD PRESSURE: 79 MMHG | SYSTOLIC BLOOD PRESSURE: 118 MMHG

## 2019-04-05 VITALS — DIASTOLIC BLOOD PRESSURE: 84 MMHG | SYSTOLIC BLOOD PRESSURE: 125 MMHG

## 2019-04-05 VITALS — SYSTOLIC BLOOD PRESSURE: 156 MMHG | DIASTOLIC BLOOD PRESSURE: 94 MMHG

## 2019-04-05 VITALS — DIASTOLIC BLOOD PRESSURE: 82 MMHG | SYSTOLIC BLOOD PRESSURE: 120 MMHG

## 2019-04-05 LAB
ADD MANUAL DIFF: NO
ANION GAP SERPL CALC-SCNC: 8 MMOL/L (ref 5–15)
BASOPHILS NFR BLD AUTO: 1.4 % (ref 0–2)
BUN SERPL-MCNC: 7 MG/DL (ref 7–18)
CALCIUM SERPL-MCNC: 9 MG/DL (ref 8.5–10.1)
CHLORIDE SERPL-SCNC: 110 MMOL/L (ref 98–107)
CO2 SERPL-SCNC: 27 MMOL/L (ref 21–32)
CREAT SERPL-MCNC: 0.8 MG/DL (ref 0.55–1.3)
EOSINOPHIL NFR BLD AUTO: 2 % (ref 0–3)
ERYTHROCYTE [DISTWIDTH] IN BLOOD BY AUTOMATED COUNT: 12 % (ref 11.6–14.8)
HCT VFR BLD CALC: 32.6 % (ref 37–47)
HGB BLD-MCNC: 10.3 G/DL (ref 12–16)
LYMPHOCYTES NFR BLD AUTO: 20.8 % (ref 20–45)
MCV RBC AUTO: 98 FL (ref 80–99)
MONOCYTES NFR BLD AUTO: 8.7 % (ref 1–10)
NEUTROPHILS NFR BLD AUTO: 67.2 % (ref 45–75)
PHOSPHATE SERPL-MCNC: 3 MG/DL (ref 2.5–4.9)
PLATELET # BLD: 249 K/UL (ref 150–450)
POTASSIUM SERPL-SCNC: 4 MMOL/L (ref 3.5–5.1)
RBC # BLD AUTO: 3.32 M/UL (ref 4.2–5.4)
SODIUM SERPL-SCNC: 144 MMOL/L (ref 136–145)
WBC # BLD AUTO: 6.6 K/UL (ref 4.8–10.8)

## 2019-04-05 RX ADMIN — DEXTROSE MONOHYDRATE SCH MLS/HR: 50 INJECTION, SOLUTION INTRAVENOUS at 02:05

## 2019-04-05 RX ADMIN — DEXTROSE MONOHYDRATE SCH MLS/HR: 50 INJECTION, SOLUTION INTRAVENOUS at 11:24

## 2019-04-05 RX ADMIN — HYDROCODONE BITARTRATE AND ACETAMINOPHEN PRN TAB: 10; 325 TABLET ORAL at 02:05

## 2019-04-05 RX ADMIN — SODIUM CHLORIDE SCH MLS/HR: 9 INJECTION, SOLUTION INTRAVENOUS at 04:17

## 2019-04-05 RX ADMIN — DEXTROSE, SODIUM CHLORIDE, AND POTASSIUM CHLORIDE SCH MLS/HR: 5; .45; .15 INJECTION INTRAVENOUS at 06:13

## 2019-04-05 RX ADMIN — HYDROCODONE BITARTRATE AND ACETAMINOPHEN PRN TAB: 10; 325 TABLET ORAL at 10:00

## 2019-04-05 RX ADMIN — HYDROCODONE BITARTRATE AND ACETAMINOPHEN PRN TAB: 10; 325 TABLET ORAL at 18:59

## 2019-04-05 RX ADMIN — DOCUSATE SODIUM SCH MG: 100 CAPSULE, LIQUID FILLED ORAL at 14:57

## 2019-04-05 RX ADMIN — DOCUSATE SODIUM SCH MG: 100 CAPSULE, LIQUID FILLED ORAL at 18:58

## 2019-04-05 RX ADMIN — HYDROCODONE BITARTRATE AND ACETAMINOPHEN PRN TAB: 10; 325 TABLET ORAL at 05:51

## 2019-04-05 RX ADMIN — DEXTROSE, SODIUM CHLORIDE, AND POTASSIUM CHLORIDE SCH MLS/HR: 5; .45; .15 INJECTION INTRAVENOUS at 10:11

## 2019-04-05 RX ADMIN — SODIUM CHLORIDE SCH MLS/HR: 9 INJECTION, SOLUTION INTRAVENOUS at 16:00

## 2019-04-05 RX ADMIN — DOCUSATE SODIUM SCH MG: 100 CAPSULE, LIQUID FILLED ORAL at 09:59

## 2019-04-05 RX ADMIN — TOPIRAMATE SCH MG: 100 TABLET, COATED ORAL at 10:00

## 2019-04-05 NOTE — NUR
****DISCHARGE PLANNED

PATIENT HAS BEEN ACCEPTED AT



[facility]

[address] [place] [address]

[id number]

T: [phone]  FOR NURSE TO NURSE REPORT

ACCEPTING PHYSICIAN IS DR RODRIGUEZ

-------------------------------------------------------------------------------

Addendum: 04/05/19 at 1601 by MINNIE CRUM LVN LVN

-------------------------------------------------------------------------------

[facility] HAS BEEN ARRANGED FOR [phone]

## 2019-04-05 NOTE — NUR
NURSE NOTES:



BED AVAILABLE AT [facility]. PATIENT AND SON-IN-LAW MADE AWARE. PLACED CALL TO 
[facility]. REPORT GIVEN TO LUIS ALBERTO WEBSTER AT FACILITY. CHART COPIED. RX IN TRANSFERRING 
ENVELOPE.

## 2019-04-05 NOTE — NUR
*-* INSURANCE *-*



UPDATED CLINICALS HAVE BEEN FAXED TO:



[facility]: 

F:[phone]

## 2019-04-05 NOTE — NUR
***DISCHARGE PLANNING

THIS  IS WORKING WITH [employer] ([phone]) IN TRYING TO SECURE A 
HOME HEALTH COMPANY FOR THIS PATIENT.

PATIENT HAS BEEN REFERRED TO 

[facility]

T: [phone]

WAITING FOR RESPONSE

-------------------------------------------------------------------------------

Addendum: 04/05/19 at 1244 by MINNIE CRUM LVN LVN

-------------------------------------------------------------------------------

 LEFT MESSAGE FOR DR MARLENE GAVIN REGARDING ANTIBIOTICS UPON DISCHARGE

## 2019-04-05 NOTE — GENERAL PROGRESS NOTE
Assessment/Plan


Assessment/Plan








Assessment and Recs:


# Anemia of chronic disease due to underlying chronic medical issues, 

multifactorial 


--> Anemia workup has been ordered/reviewed and no sig deficiency is noted


--> No evidence of hemolysis is noted, peripheral smear has been reviewed.


--> Hgb goal >7. Transfuse prn.


--> Epogen or iron at this time is not particularly indicated


--> Medications have been reviewed


--> evaluate with Gi team prn


--> transfuse if hgb is < 7 (will trend CBC daily)


# Leukocytosis/Elevated white blood cell count, unspecified likely related to 

underlying stress reaction, smoking v more likely infection from surgical site 

drainage


--> have reviewed peripheral smear and bandemia/neutrophilia noted


--> continue antibiotics if they have been started by ID team


--> monitor for resolution


# History of gastric bypass


--> surg franca prn


# Abscess/Surgical site infection


--> Follow-up wound cultures








The timing of this note does not necessarily reflect the time of the patient 

was seen.





Greatly appreciate consultation!





Subjective


HEENT:  Denies: no symptoms, eye pain, blurred vision, tearing, double vision, 

ear pain, ear discharge, nose pain, nose congestion, throat pain, throat 

swelling, mouth pain, mouth swelling, other


Respiratory:  Denies: no symptoms, cough, orthopnea, shortness of breath, SOB 

with excertion, SOB at rest, sputum, stridor, wheezing, other


Gastrointestinal/Abdominal:  Denies: no symptoms, abdomen distended, abdominal 

pain, black stools, tarry stools, blood in stool, constipated, diarrhea, 

difficulty swallowing, nausea, poor appetite, poor fluid intake, rectal bleeding

, vomiting, other


Neurologic/Psychiatric:  Denies: no symptoms, anxiety, depressed, emotional 

problems, headache, numbness, paresthesia, pre-existing deficit, seizure, 

tingling, tremors, weakness, other


Hematologic/Lymphatic:  Denies: no symptoms, anemia, easy bleeding, easy 

bruising, other


Allergies:  


Coded Allergies:  


     No Known Allergies (Unverified , 4/2/19)


Subjective


4/4: better abdominal healing, less pain, labs reviewed


4/5: feeling better, potentially dc soon





Objective





Last 24 Hour Vital Signs








  Date Time  Temp Pulse Resp B/P (MAP) Pulse Ox O2 Delivery O2 Flow Rate FiO2


 


4/5/19 16:00 97.8 86 17 156/94 (114) 95   


 


4/5/19 12:00 98.6 80 17 125/84 (98) 94   


 


4/5/19 10:30 98.4       


 


4/5/19 10:00  77  120/82    


 


4/5/19 09:00      Room Air  


 


4/5/19 08:00 98.4 77 18 120/82 (95) 95   


 


4/5/19 04:00 97.5 80 18 118/79 (92) 96   


 


4/4/19 21:00      Room Air  


 


4/4/19 20:00 98.7 86 20 100/67 (78) 95   

















Intake and Output  


 


 4/4/19 4/5/19





 19:00 07:00


 


Intake Total 1517.500 ml 340 ml


 


Balance 1517.500 ml 340 ml


 


  


 


Intake Oral 600 ml 340 ml


 


IV Total 917.500 ml 


 


# Voids 3 








Laboratory Tests


4/5/19 03:07: 


White Blood Count 6.6, Red Blood Count 3.32L, Hemoglobin 10.3L, Hematocrit 32.6L

, Mean Corpuscular Volume 98, Mean Corpuscular Hemoglobin 31.1H, Mean 

Corpuscular Hemoglobin Concent 31.7L, Red Cell Distribution Width 12.0, 

Platelet Count 249, Mean Platelet Volume 6.4L, Neutrophils (%) (Auto) 67.2, 

Lymphocytes (%) (Auto) 20.8, Monocytes (%) (Auto) 8.7, Eosinophils (%) (Auto) 

2.0, Basophils (%) (Auto) 1.4, Sodium Level 144, Potassium Level 4.0, Chloride 

Level 110H, Carbon Dioxide Level 27, Anion Gap 8, Blood Urea Nitrogen 7, 

Creatinine 0.8, Estimat Glomerular Filtration Rate > 60, Glucose Level 93, 

Calcium Level 9.0, Phosphorus Level 3.0, Vancomycin Level Trough 10.6


Height (Feet):  5


Height (Inches):  5.00


Weight (Pounds):  230


Objective


General Appearance:  no acute distress


HEENT:  mucous membranes moist


Respiratory/Chest:  lungs clear


Cardiovascular:  normal rate


Abdomen:  other - soft, LLQ wound


Neurologic/Psychiatric:  alert, oriented x 3, responsive











Brennen Brooke MD Apr 5, 2019 18:14

## 2019-04-05 NOTE — GI PROGRESS NOTE
Assessment/Plan


Problems:  


(1) History of gastric bypass


ICD Codes:  Z98.84 - Bariatric surgery status


SNOMED:  840751332


(2) Surgical site infection


ICD Codes:  T81.49XA - Infection following a procedure, other surgical site, 

initial encounter


SNOMED:  60071821, 750001474


(3) Anemia


ICD Codes:  D64.9 - Anemia, unspecified


SNOMED:  665741615


(4) Surgical site infection


ICD Codes:  T81.49XA - Infection following a procedure, other surgical site, 

initial encounter


SNOMED:  83228059, 656502071


(5) Abscess


ICD Codes:  L02.91 - Cutaneous abscess, unspecified


SNOMED:  290272843


Status:  stable


Status Narrative


Discussed with Dr. Mancia


Assessment/Plan


Recent history of gastric bypass


Anemia workup reviewed





Advance diet to pure and/or full liquid diet only given recent history of 

gastric bypass.


Follow-up ID recommendations


Surgical site dressing changes twice daily per surgery


Pain management


OB stool r/o GI bleed


monitor H&H, prn transfusions


Electrolyte correction


bowel regime


ppi


Zofran as needed


fu labs





The patient was seen and examined at bedside and all new and available data was 

reviewed in the patients chart. I agree with the above findings, impression 

and plan.  (Patient seen earlier today. Signature stamp does not reflect 

patient encounter time.). - Michael Mancia MD





Subjective


Gastrointestinal/Abdominal:  Reports: no symptoms


Subjective


Tolerating pure and full liquid diet





Objective





Last 24 Hour Vital Signs








  Date Time  Temp Pulse Resp B/P (MAP) Pulse Ox O2 Delivery O2 Flow Rate FiO2


 


4/5/19 10:30 98.4       


 


4/5/19 10:00  77  120/82    


 


4/5/19 08:00 98.4 77 18 120/82 (95) 95   


 


4/5/19 04:00 97.5 80 18 118/79 (92) 96   


 


4/4/19 21:00      Room Air  


 


4/4/19 20:00 98.7 86 20 100/67 (78) 95   


 


4/4/19 16:00 98.6 85 18 118/77 (91) 96   

















Intake and Output  


 


 4/4/19 4/5/19





 18:59 06:59


 


Intake Total 1467.500 ml 390 ml


 


Balance 1467.500 ml 390 ml


 


  


 


Intake Oral 600 ml 340 ml


 


IV Total 867.500 ml 50 ml


 


# Voids 3 











Laboratory Tests








Test


  4/5/19


03:07


 


White Blood Count


  6.6 K/UL


(4.8-10.8)


 


Red Blood Count


  3.32 M/UL


(4.20-5.40)  L


 


Hemoglobin


  10.3 G/DL


(12.0-16.0)  L


 


Hematocrit


  32.6 %


(37.0-47.0)  L


 


Mean Corpuscular Volume 98 FL (80-99)  


 


Mean Corpuscular Hemoglobin


  31.1 PG


(27.0-31.0)  H


 


Mean Corpuscular Hemoglobin


Concent 31.7 G/DL


(32.0-36.0)  L


 


Red Cell Distribution Width


  12.0 %


(11.6-14.8)


 


Platelet Count


  249 K/UL


(150-450)


 


Mean Platelet Volume


  6.4 FL


(6.5-10.1)  L


 


Neutrophils (%) (Auto)


  67.2 %


(45.0-75.0)


 


Lymphocytes (%) (Auto)


  20.8 %


(20.0-45.0)


 


Monocytes (%) (Auto)


  8.7 %


(1.0-10.0)


 


Eosinophils (%) (Auto)


  2.0 %


(0.0-3.0)


 


Basophils (%) (Auto)


  1.4 %


(0.0-2.0)


 


Sodium Level


  144 MMOL/L


(136-145)


 


Potassium Level


  4.0 MMOL/L


(3.5-5.1)


 


Chloride Level


  110 MMOL/L


()  H


 


Carbon Dioxide Level


  27 MMOL/L


(21-32)


 


Anion Gap


  8 mmol/L


(5-15)


 


Blood Urea Nitrogen


  7 mg/dL (7-18)


 


 


Creatinine


  0.8 MG/DL


(0.55-1.30)


 


Estimat Glomerular Filtration


Rate > 60 mL/min


(>60)


 


Glucose Level


  93 MG/DL


()


 


Calcium Level


  9.0 MG/DL


(8.5-10.1)


 


Phosphorus Level


  3.0 MG/DL


(2.5-4.9)


 


Vancomycin Level Trough


  10.6 ug/mL


(5.0-12.0)








Height (Feet):  5


Height (Inches):  5.00


Weight (Pounds):  230


General Appearance:  WD/WN, no apparent distress, alert


Cardiovascular:  normal rate


Respiratory/Chest:  normal breath sounds, no respiratory distress


Abdominal Exam:  normal bowel sounds, non tender, soft


Extremities:  normal range of motion, non-tender











Katherine Leslie NP Apr 5, 2019 12:23

## 2019-04-05 NOTE — INFECTIOUS DISEASES PROG NOTE
Assessment/Plan


Assessment/Plan


IMPRESSION:  


Surgical site infection & abscess that was drained by


the surgeon. Culture alpha hemolytic strep


hypokalemia, 


status post gastric bypass & revision,  


obesity, 


hypertension.





RECOMMENDATIONS:  


continue with vancomycin and Zosyn in hospital


At time of discharge PO Augmentin X 7 days





Subjective


ROS Limited/Unobtainable:  No


Constitutional:  Reports: no symptoms


Respiratory:  Reports: no symptoms


Cardiovascular:  Reports: no symptoms


Gastrointestinal/Abdominal:  Reports: no symptoms


Genitourinary:  Reports: no symptoms


Allergies:  


Coded Allergies:  


     No Known Allergies (Unverified , 4/2/19)





Objective


Vital Signs





Last 24 Hour Vital Signs








  Date Time  Temp Pulse Resp B/P (MAP) Pulse Ox O2 Delivery O2 Flow Rate FiO2


 


4/5/19 10:30 98.4       


 


4/5/19 10:00  77  120/82    


 


4/5/19 08:00 98.4 77 18 120/82 (95) 95   


 


4/5/19 04:00 97.5 80 18 118/79 (92) 96   


 


4/4/19 21:00      Room Air  


 


4/4/19 20:00 98.7 86 20 100/67 (78) 95   


 


4/4/19 16:00 98.6 85 18 118/77 (91) 96   








Height (Feet):  5


Height (Inches):  5.00


Weight (Pounds):  230


General Appearance:  no acute distress


HEENT:  mucous membranes moist


Respiratory/Chest:  lungs clear


Cardiovascular:  normal rate


Abdomen:  other - soft, LLQ wound


Neurologic/Psychiatric:  alert, oriented x 3, responsive





Microbiology








 Date/Time


Source Procedure


Growth Status


 


 


 4/2/19 13:30


Blood Blood Culture - Preliminary


NO GROWTH AFTER 48 HOURS Resulted


 


 4/2/19 13:15


Blood Blood Culture - Preliminary


NO GROWTH AFTER 48 HOURS Resulted





 4/2/19 21:45


Abdomen Gram Stain - Final Resulted


 


 4/2/19 21:45


Abdomen Wound Culture


Pending Resulted











Laboratory Tests








Test


  4/5/19


03:07


 


White Blood Count


  6.6 K/UL


(4.8-10.8)


 


Red Blood Count


  3.32 M/UL


(4.20-5.40)  L


 


Hemoglobin


  10.3 G/DL


(12.0-16.0)  L


 


Hematocrit


  32.6 %


(37.0-47.0)  L


 


Mean Corpuscular Volume 98 FL (80-99)  


 


Mean Corpuscular Hemoglobin


  31.1 PG


(27.0-31.0)  H


 


Mean Corpuscular Hemoglobin


Concent 31.7 G/DL


(32.0-36.0)  L


 


Red Cell Distribution Width


  12.0 %


(11.6-14.8)


 


Platelet Count


  249 K/UL


(150-450)


 


Mean Platelet Volume


  6.4 FL


(6.5-10.1)  L


 


Neutrophils (%) (Auto)


  67.2 %


(45.0-75.0)


 


Lymphocytes (%) (Auto)


  20.8 %


(20.0-45.0)


 


Monocytes (%) (Auto)


  8.7 %


(1.0-10.0)


 


Eosinophils (%) (Auto)


  2.0 %


(0.0-3.0)


 


Basophils (%) (Auto)


  1.4 %


(0.0-2.0)


 


Sodium Level


  144 MMOL/L


(136-145)


 


Potassium Level


  4.0 MMOL/L


(3.5-5.1)


 


Chloride Level


  110 MMOL/L


()  H


 


Carbon Dioxide Level


  27 MMOL/L


(21-32)


 


Anion Gap


  8 mmol/L


(5-15)


 


Blood Urea Nitrogen


  7 mg/dL (7-18)


 


 


Creatinine


  0.8 MG/DL


(0.55-1.30)


 


Estimat Glomerular Filtration


Rate > 60 mL/min


(>60)


 


Glucose Level


  93 MG/DL


()


 


Calcium Level


  9.0 MG/DL


(8.5-10.1)


 


Phosphorus Level


  3.0 MG/DL


(2.5-4.9)


 


Vancomycin Level Trough


  10.6 ug/mL


(5.0-12.0)











Current Medications








 Medications


  (Trade)  Dose


 Ordered  Sig/Justin


 Route


 PRN Reason  Start Time


 Stop Time Status Last Admin


Dose Admin


 


 Acetaminophen


  (Tylenol)  500 mg  Q4H  PRN


 ORAL


 Mild Pain/Temp > 100.5  4/2/19 20:15


 5/2/19 20:14   


 


 


 Acetaminophen/


 Hydrocodone Bitart


  (Norco 10/325)  1 tab  Q4H  PRN


 ORAL


 severe pain   4/3/19 09:15


 4/10/19 09:14  4/5/19 10:00


 


 


 Amlodipine


 Besylate


  (Norvasc)  2.5 mg  DAILY


 ORAL


   4/4/19 09:00


 5/3/19 08:59  4/5/19 10:00


 


 


 Baclofen


  (Lioresal)  10 mg  Q8H  PRN


 ORAL


 Muscle Spasm  4/2/19 20:15


 5/2/19 20:14   


 


 


 Clonidine HCl


  (Catapres Tab)  0.1 mg  Q4H  PRN


 ORAL


 For High Blood Pressure  4/2/19 20:00


 5/2/19 19:59   


 


 


 Dextrose/


 Electrolytes  1,000 ml @ 


 50 mls/hr  Q20H


 IV


   4/3/19 15:00


 5/3/19 14:59  4/5/19 10:11


 


 


 Docusate Sodium


  (Colace)  100 mg  TID


 ORAL


   4/3/19 18:00


 5/2/19 20:14  4/5/19 09:59


 


 


 Escitalopram


 Oxalate


  (Lexapro)  20 mg  DAILY


 ORAL


   4/3/19 09:00


 5/3/19 08:59  4/5/19 10:00


 


 


 Gabapentin


  (Neurontin)  600 mg  THREE TIMES A  DAY


 ORAL


   4/3/19 09:15


 5/3/19 09:14  4/5/19 09:58


 


 


 Ondansetron HCl


  (Zofran)  4 mg  Q6H  PRN


 IVP


 Nausea & Vomiting  4/2/19 20:45


 5/2/19 20:44   


 


 


 Piperacillin Sod/


 Tazobactam Sod


 3.375 gm/Sodium


 Chloride  110 ml @ 


 27.5 mls/hr  Q8H


 IVPB


   4/3/19 11:00


 4/10/19 10:59  4/5/19 11:24


 


 


 Potassium Chloride


  (K-Dur)  40 meq  DAILY


 ORAL


   4/3/19 09:00


 5/3/19 08:59  4/5/19 09:59


 


 


 Topiramate


  (Topamax)  100 mg  DAILY


 ORAL


   4/2/19 20:15


 5/2/19 20:14  4/5/19 10:00


 


 


 Vancomycin HCl


  (Vanco rx to


 dose)  1 ea  DAILY  PRN


 MISC


 Per rx protocol  4/3/19 14:15


 5/3/19 14:14   


 


 


 Vancomycin HCl


 750 mg/Sodium


 Chloride  275 ml @ 


 183.333


 mls/hr  Q12H


 IVPB


   4/3/19 16:00


 4/8/19 15:59  4/5/19 04:17


 

















Maximus Mcnamara MD Apr 5, 2019 12:59

## 2019-04-05 NOTE — NUR
***DISCHARGE PLANNING UPDATE

 WAS ABLE TO SECURE A HOME HEALTH IN HER AREA TO HELP WITH WOUND CARE

[facility] T: [phone] HOWEVER

PATIENT IS REFUSING TO TRY TO DO SELF DRESSING CHANGES. SHE ALSO SAID HER  CANNOT 
HELP EITHER

SHE IS AGREEABLE TO SNF PLACEMENT

 CALLED FAN AT [facility] T: [phone] AND INFORMED HER OF ABOVE

FAN PROVIDED THE FOLLOWING SNF'S TO REFER PATIENT TO



[facility] T: [phone]

[facility] T:[phone]

[facility] T: [phone]

[facility] T: [phone]

**WAITING TO SEE IF ANY WILL ACCEPT

## 2019-04-05 NOTE — NUR
NURSE NOTES:





WALKING ROUNDS DONE WITH OUTGOING RN. PATIENT AWAKE IN BED. QUESTIONS ANSWERED; NEEDS MET AT 
THIS TIME. DISCUSSED PLAN OF CARE FOR THE DAY. VERBALIZED UNDERSTANDING. CALL LIGHT WITHIN 
REACH.

## 2019-04-05 NOTE — GENERAL PROGRESS NOTE
Assessment/Plan


Assessment/Plan


(1) Abdominal pain


(2) S/p gastric bypass revision


(3) Surgical wound infection


(4) Morbid obesity


Patient will be continued on [place]


D/w Dr. Sparks and he concurred.





Subjective


Date patient seen:  Apr 5, 2019


Time patient seen:  07:15 - am


Constitutional:  Reports: no symptoms


HEENT:  Reports: no symptoms


Cardiovascular:  Reports: no symptoms


Respiratory:  Reports: no symptoms


Gastrointestinal/Abdominal:  Reports: abdominal pain


Genitourinary:  Reports: no symptoms


Neurologic/Psychiatric:  Reports: no symptoms


Endocrine:  Reports: no symptoms


Hematologic/Lymphatic:  Reports: no symptoms


Allergies:  


Coded Allergies:  


     No Known Allergies (Unverified , 4/2/19)


Subjective


Patient has been in bed and reports continued abdominal pain however it has 

been tolerated on the Norco. 


She has no new complaints at this time.





Objective





Last 24 Hour Vital Signs








  Date Time  Temp Pulse Resp B/P (MAP) Pulse Ox O2 Delivery O2 Flow Rate FiO2


 


4/5/19 08:00 98.4 77 18 120/82 (95) 95   


 


4/5/19 04:00 97.5 80 18 118/79 (92) 96   


 


4/4/19 21:00      Room Air  


 


4/4/19 20:00 98.7 86 20 100/67 (78) 95   


 


4/4/19 16:00 98.6 85 18 118/77 (91) 96   


 


4/4/19 15:17 97.8       


 


4/4/19 12:00 97.8 80 18 112/75 (87) 96   

















Intake and Output  


 


 4/4/19 4/5/19





 18:59 06:59


 


Intake Total 1467.500 ml 390 ml


 


Balance 1467.500 ml 390 ml


 


  


 


Intake Oral 600 ml 340 ml


 


IV Total 867.500 ml 50 ml


 


# Voids 3 








Laboratory Tests


4/5/19 03:07: 


White Blood Count 6.6, Red Blood Count 3.32L, Hemoglobin 10.3L, Hematocrit 32.6L

, Mean Corpuscular Volume 98, Mean Corpuscular Hemoglobin 31.1H, Mean 

Corpuscular Hemoglobin Concent 31.7L, Red Cell Distribution Width 12.0, 

Platelet Count 249, Mean Platelet Volume 6.4L, Neutrophils (%) (Auto) 67.2, 

Lymphocytes (%) (Auto) 20.8, Monocytes (%) (Auto) 8.7, Eosinophils (%) (Auto) 

2.0, Basophils (%) (Auto) 1.4, Sodium Level 144, Potassium Level 4.0, Chloride 

Level 110H, Carbon Dioxide Level 27, Anion Gap 8, Blood Urea Nitrogen 7, 

Creatinine 0.8, Estimat Glomerular Filtration Rate > 60, Glucose Level 93, 

Calcium Level 9.0, Phosphorus Level 3.0, Vancomycin Level Trough 10.6


Height (Feet):  5


Height (Inches):  5.00


Weight (Pounds):  230


General Appearance:  no apparent distress, alert


EENT:  PERRL/EOMI, normal ENT inspection


Neck:  non-tender, normal alignment


Cardiovascular:  normal rate, regular rhythm


Respiratory/Chest:  lungs clear, normal breath sounds


Abdomen:  tender


Extremities:  non-tender


Edema:  no edema noted Arm (L), no edema noted Arm (R), no edema noted Leg (L), 

no edema noted Leg (R), no edema noted Pedal (L), no edema noted Pedal (R), no 

edema noted Generalized


Neurologic:  alert, oriented x 3


Skin:  warm/dry











Som Roberts Apr 5, 2019 09:04

## 2019-04-05 NOTE — NUR
NURSE NOTES:



AMBULANCE SERVICE HERE TO .ALL BELONGINGS WITH PATIENT. REPORT GIVEN TO AMBULANCE 
SERVICE. TRANSPORTED VIA [facility] TO [facility].

## 2019-04-08 NOTE — DISCHARGE SUMMARY
Discharge Summary


Discharge Summary


_


DATE OF ADMISSION: 04/02/2019





DATE OF DISCHARGE: 04/05/2019








DISCHARGED BY: Dr Borges 





REASON FOR ADMISSION: 


58 years old female with past medical history of hypertension, asthma, gastric 

bypass, hypercholesterolemia, presented to emergency department with complaint 

of discomfort in the lower abdomen.  


Patient reported  oozing from the left lower abdominal area.  


Patient denied fever and chills.  


Patient denied chest pain or shortness of breath.  


Patient denied vomiting and diarrhea.  


Patient had a gastric bypass surgery done a week ago


Upon evaluation vital signs were stable.


Laboratory workup revealed leukocytosis with WBC 19.1, hemoglobin and 

hematocrit stable.


Potassium 3.1.


BUN 24 creatinine 1.3, anion gap 18.  


Stable LFT.


CT of the abdomen and pelvis revealed inflammatory changes of the subcutaneous 

fat of the left lower quadrant.  


As this area appears to be more inflamed than the other laparoscopy port and 

reportedly draining fluid, findings were suspicious  for infection.  


Possibility of abscess could not be excluded.  


Cholelithiasis noted.  


Endplate compression fracture L2 and L3 acuity undetermined.  


Patient appeared to have findings,  consistent with  surgical site infection 

with opening wound with pus drainage.  


Patient pancultured , started on antibiotics and  was admitted for further 

management.


 


CONSULTANTS:


ID specialist Dr. Blunt


GI specialist Dr. Mancia


nephrologist Dr. Mi


hematologist/oncologist Dr. Brooke


general surgery Dr. Davis


pain specialist Dr. Sparks








HOSPITAL COURSE: 


Surgical consult was requested. 


Large amount of pus was drained by surgeon. 


Wound care provided as per surgeon recommendation.  Patient required packing 

twice a day.  


Blood cultures were negative.  


Wound culture revealed Strep viridans.


Infectious disease specialist followed.


Antibiotic provided as per infectious disease specialist recommendation.


Patient was on vancomycin and Zosyn.


At time of discharge IV antibiotics changed to Augmentin for 7 additional days.

  


Leukocytosis resolved, no fevers.





Nephrologist followed.  


Renal parameters and electrolytes were closely monitored.  Electrolytes 

corrected as needed.  


Patient was on the IV hydration.  


Nephrotoxins were avoided.  


Potassium and phosphorus were replaced and stable prior to discharge.  


Blood pressure was managed with calcium channel blocker and remained stable.


Lipid panel stable.  Statin was continued. 





GI specialist followed.  


Patient started on diet and was advanced to full liquid diet  only, given 

recent history of gastric bypass. 


Hemoglobin and hematocrit were closely monitored with goal to keep hemoglobin 

above 7


Prior to discharge hemoglobin 10.3, hematocrit 32.6.


Anemia workup was consistent with anemia of chronic disease.


CEA within normal limits.


B12 folate TSH all within normal limi


Bowel regimen instituted.


Antiemetic provided as needed.  


Patient was placed on GI prophylaxis with  PPI.





Pain management was addressed as per pain specialist recommendation.    





Hematologist followed.  


Per hematologist, anemia of chronic disease was due to underlying chronic 

medical issue and was multifactorial.  


No evidence of hemolysis noted.





Patient clinically stabilized and was ready for discharge to skilled nursing 

facility for continuation of care.








FINAL DIAGNOSES: 


Surgical site infection and abscess , status post drainage


Status post  recent gastric bypass with revision


Morbid obesity


Hypertension


Electrolyte imbalance


Anemia of chronic disease





DISCHARGE MEDICATIONS:


See Medication Reconciliation list.





DISCHARGE INSTRUCTIONS:


Patient was discharged to the skilled nursing facility. 


Follow up with medical doctor at the facility.











I have been assigned to dictate discharge summary for this account. 


I was not involved in the patient's management.











Alysa Carrillo NP Apr 8, 2019 12:08

## 2025-04-18 NOTE — NUR
ED Nurse Note:



Pt taken to CT via [facility]iah. Procedure   Insert Temp Indwelling Blad Cath, Simple    Date/Time: 4/18/2025 3:16 PM    Performed by: Aruan Mcgrath RN  Authorized by: Sarah David MD  Preparation: Patient was prepped and draped in the usual sterile fashion.  Local anesthesia used: no    Anesthesia:  Local anesthesia used: no    Sedation:  Patient sedated: no    Patient tolerance: patient tolerated the procedure well with no immediate complications  Comments: Patient in supine position.  Using sterile technique inserted 16fr straight ramirez catheter.  Drained bladder and then instilled 25mg of BCG into the bladder.  Patient tolerated well.  Advised patient of after care instructions and gave handout with instructions on it.  Patient verbalized understanding with teach back.